# Patient Record
Sex: FEMALE | Race: WHITE | Employment: UNEMPLOYED | URBAN - METROPOLITAN AREA
[De-identification: names, ages, dates, MRNs, and addresses within clinical notes are randomized per-mention and may not be internally consistent; named-entity substitution may affect disease eponyms.]

---

## 2017-07-09 ENCOUNTER — TELEPHONE (OUTPATIENT)
Dept: BEHAVIORAL HEALTH | Facility: CLINIC | Age: 27
End: 2017-07-09

## 2017-07-09 ENCOUNTER — HOSPITAL ENCOUNTER (EMERGENCY)
Facility: CLINIC | Age: 27
Discharge: ANOTHER HOSPITAL | End: 2017-07-10
Attending: PHYSICIAN ASSISTANT | Admitting: PHYSICIAN ASSISTANT
Payer: COMMERCIAL

## 2017-07-09 DIAGNOSIS — F23 ACUTE PSYCHOSIS (H): ICD-10-CM

## 2017-07-09 DIAGNOSIS — F41.9 ANXIETY: ICD-10-CM

## 2017-07-09 DIAGNOSIS — R45.851 SUICIDAL THOUGHTS: ICD-10-CM

## 2017-07-09 DIAGNOSIS — F23 ACUTE PARANOID REACTION (H): ICD-10-CM

## 2017-07-09 DIAGNOSIS — F90.9 ATTENTION DEFICIT HYPERACTIVITY DISORDER (ADHD), UNSPECIFIED ADHD TYPE: ICD-10-CM

## 2017-07-09 DIAGNOSIS — F30.9 MANIA (H): ICD-10-CM

## 2017-07-09 LAB
ALBUMIN SERPL-MCNC: 3.9 G/DL (ref 3.4–5)
ALBUMIN UR-MCNC: NEGATIVE MG/DL
ALP SERPL-CCNC: 110 U/L (ref 40–150)
ALT SERPL W P-5'-P-CCNC: 24 U/L (ref 0–50)
AMPHETAMINES UR QL: ABNORMAL NG/ML
ANION GAP SERPL CALCULATED.3IONS-SCNC: 7 MMOL/L (ref 3–14)
APAP SERPL-MCNC: NORMAL MG/L (ref 10–20)
APPEARANCE UR: CLEAR
AST SERPL W P-5'-P-CCNC: 25 U/L (ref 0–45)
BACTERIA #/AREA URNS HPF: ABNORMAL /HPF
BARBITURATES UR QL SCN: ABNORMAL NG/ML
BASOPHILS # BLD AUTO: 0 10E9/L (ref 0–0.2)
BASOPHILS NFR BLD AUTO: 0.4 %
BENZODIAZ UR QL SCN: ABNORMAL NG/ML
BILIRUB SERPL-MCNC: 1 MG/DL (ref 0.2–1.3)
BILIRUB UR QL STRIP: NEGATIVE
BUN SERPL-MCNC: 9 MG/DL (ref 7–30)
BUPRENORPHINE UR QL: ABNORMAL NG/ML
CALCIUM SERPL-MCNC: 8.4 MG/DL (ref 8.5–10.1)
CANNABINOIDS UR QL: ABNORMAL NG/ML
CHLORIDE SERPL-SCNC: 99 MMOL/L (ref 94–109)
CO2 SERPL-SCNC: 29 MMOL/L (ref 20–32)
COCAINE UR QL SCN: ABNORMAL NG/ML
COLOR UR AUTO: YELLOW
CREAT SERPL-MCNC: 0.55 MG/DL (ref 0.52–1.04)
CRP SERPL-MCNC: <2.9 MG/L (ref 0–8)
D-METHAMPHET UR QL: ABNORMAL NG/ML
DIFFERENTIAL METHOD BLD: NORMAL
EOSINOPHIL # BLD AUTO: 0.1 10E9/L (ref 0–0.7)
EOSINOPHIL NFR BLD AUTO: 1.1 %
ERYTHROCYTE [DISTWIDTH] IN BLOOD BY AUTOMATED COUNT: 13.5 % (ref 10–15)
ETHANOL SERPL-MCNC: <0.01 G/DL
GFR SERPL CREATININE-BSD FRML MDRD: ABNORMAL ML/MIN/1.7M2
GLUCOSE SERPL-MCNC: 86 MG/DL (ref 70–99)
GLUCOSE UR STRIP-MCNC: NEGATIVE MG/DL
HCG UR QL: NEGATIVE
HCT VFR BLD AUTO: 39 % (ref 35–47)
HGB BLD-MCNC: 13 G/DL (ref 11.7–15.7)
HGB UR QL STRIP: NEGATIVE
IMM GRANULOCYTES # BLD: 0 10E9/L (ref 0–0.4)
IMM GRANULOCYTES NFR BLD: 0.1 %
KETONES UR STRIP-MCNC: NEGATIVE MG/DL
LEUKOCYTE ESTERASE UR QL STRIP: ABNORMAL
LYMPHOCYTES # BLD AUTO: 2.4 10E9/L (ref 0.8–5.3)
LYMPHOCYTES NFR BLD AUTO: 31.6 %
MCH RBC QN AUTO: 29.1 PG (ref 26.5–33)
MCHC RBC AUTO-ENTMCNC: 33.3 G/DL (ref 31.5–36.5)
MCV RBC AUTO: 87 FL (ref 78–100)
METHADONE UR QL SCN: ABNORMAL NG/ML
MONOCYTES # BLD AUTO: 0.8 10E9/L (ref 0–1.3)
MONOCYTES NFR BLD AUTO: 10.6 %
NEUTROPHILS # BLD AUTO: 4.2 10E9/L (ref 1.6–8.3)
NEUTROPHILS NFR BLD AUTO: 56.2 %
NITRATE UR QL: NEGATIVE
OPIATES UR QL SCN: ABNORMAL NG/ML
OXYCODONE UR QL SCN: ABNORMAL NG/ML
PCP UR QL SCN: ABNORMAL NG/ML
PH UR STRIP: 7 PH (ref 5–7)
PLATELET # BLD AUTO: 232 10E9/L (ref 150–450)
POTASSIUM SERPL-SCNC: 3 MMOL/L (ref 3.4–5.3)
PROPOXYPH UR QL: ABNORMAL NG/ML
PROT SERPL-MCNC: 7.5 G/DL (ref 6.8–8.8)
RBC # BLD AUTO: 4.46 10E12/L (ref 3.8–5.2)
RBC #/AREA URNS AUTO: 1 /HPF (ref 0–2)
SALICYLATES SERPL-MCNC: NORMAL MG/DL
SODIUM SERPL-SCNC: 135 MMOL/L (ref 133–144)
SP GR UR STRIP: 1.01 (ref 1–1.03)
SQUAMOUS #/AREA URNS AUTO: 3 /HPF (ref 0–1)
TRICYCLICS UR QL SCN: ABNORMAL NG/ML
TSH SERPL DL<=0.005 MIU/L-ACNC: 1.07 MU/L (ref 0.4–4)
URN SPEC COLLECT METH UR: ABNORMAL
UROBILINOGEN UR STRIP-MCNC: 0 MG/DL (ref 0–2)
WBC # BLD AUTO: 7.5 10E9/L (ref 4–11)
WBC #/AREA URNS AUTO: 6 /HPF (ref 0–2)

## 2017-07-09 PROCEDURE — 87086 URINE CULTURE/COLONY COUNT: CPT | Performed by: PHYSICIAN ASSISTANT

## 2017-07-09 PROCEDURE — 86140 C-REACTIVE PROTEIN: CPT | Performed by: PHYSICIAN ASSISTANT

## 2017-07-09 PROCEDURE — 80329 ANALGESICS NON-OPIOID 1 OR 2: CPT | Performed by: PHYSICIAN ASSISTANT

## 2017-07-09 PROCEDURE — 25000128 H RX IP 250 OP 636: Performed by: PHYSICIAN ASSISTANT

## 2017-07-09 PROCEDURE — 80306 DRUG TEST PRSMV INSTRMNT: CPT | Performed by: PHYSICIAN ASSISTANT

## 2017-07-09 PROCEDURE — 99285 EMERGENCY DEPT VISIT HI MDM: CPT | Mod: 25 | Performed by: PHYSICIAN ASSISTANT

## 2017-07-09 PROCEDURE — 25000125 ZZHC RX 250: Performed by: PHYSICIAN ASSISTANT

## 2017-07-09 PROCEDURE — 90791 PSYCH DIAGNOSTIC EVALUATION: CPT

## 2017-07-09 PROCEDURE — 81001 URINALYSIS AUTO W/SCOPE: CPT | Performed by: PHYSICIAN ASSISTANT

## 2017-07-09 PROCEDURE — 96372 THER/PROPH/DIAG INJ SC/IM: CPT | Performed by: PHYSICIAN ASSISTANT

## 2017-07-09 PROCEDURE — 80320 DRUG SCREEN QUANTALCOHOLS: CPT | Performed by: PHYSICIAN ASSISTANT

## 2017-07-09 PROCEDURE — 99285 EMERGENCY DEPT VISIT HI MDM: CPT | Mod: Z6 | Performed by: PHYSICIAN ASSISTANT

## 2017-07-09 PROCEDURE — 96374 THER/PROPH/DIAG INJ IV PUSH: CPT | Performed by: PHYSICIAN ASSISTANT

## 2017-07-09 PROCEDURE — 85025 COMPLETE CBC W/AUTO DIFF WBC: CPT | Performed by: PHYSICIAN ASSISTANT

## 2017-07-09 PROCEDURE — 80053 COMPREHEN METABOLIC PANEL: CPT | Performed by: PHYSICIAN ASSISTANT

## 2017-07-09 PROCEDURE — 84443 ASSAY THYROID STIM HORMONE: CPT | Performed by: PHYSICIAN ASSISTANT

## 2017-07-09 PROCEDURE — 81025 URINE PREGNANCY TEST: CPT | Performed by: PHYSICIAN ASSISTANT

## 2017-07-09 PROCEDURE — S0166 INJ OLANZAPINE 2.5MG: HCPCS | Performed by: PHYSICIAN ASSISTANT

## 2017-07-09 RX ORDER — LORAZEPAM 2 MG/ML
1 INJECTION INTRAMUSCULAR ONCE
Status: COMPLETED | OUTPATIENT
Start: 2017-07-09 | End: 2017-07-10

## 2017-07-09 RX ORDER — LIDOCAINE 40 MG/G
CREAM TOPICAL
Status: DISCONTINUED | OUTPATIENT
Start: 2017-07-09 | End: 2017-07-10 | Stop reason: HOSPADM

## 2017-07-09 RX ORDER — OLANZAPINE 10 MG/2ML
10 INJECTION, POWDER, FOR SOLUTION INTRAMUSCULAR
Status: COMPLETED | OUTPATIENT
Start: 2017-07-09 | End: 2017-07-09

## 2017-07-09 RX ORDER — ACAMPROSATE CALCIUM 333 MG/1
666 TABLET, DELAYED RELEASE ORAL 3 TIMES DAILY
Status: ON HOLD | COMMUNITY
End: 2017-07-25

## 2017-07-09 RX ORDER — LORAZEPAM 2 MG/ML
1 INJECTION INTRAMUSCULAR ONCE
Status: COMPLETED | OUTPATIENT
Start: 2017-07-09 | End: 2017-07-09

## 2017-07-09 RX ADMIN — LORAZEPAM 1 MG: 2 INJECTION INTRAMUSCULAR; INTRAVENOUS at 18:02

## 2017-07-09 RX ADMIN — OLANZAPINE 10 MG: 10 INJECTION, POWDER, LYOPHILIZED, FOR SOLUTION INTRAMUSCULAR at 18:35

## 2017-07-09 ASSESSMENT — ENCOUNTER SYMPTOMS
FEVER: 0
ABDOMINAL PAIN: 0
HALLUCINATIONS: 0
SHORTNESS OF BREATH: 0
HYPERACTIVE: 1
FATIGUE: 1
SLEEP DISTURBANCE: 1
NERVOUS/ANXIOUS: 1
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
## 2017-07-09 NOTE — ED NOTES
Pt sitting on the floor reading magazines at this time.  Is waiting on her dinner tray and urine results.

## 2017-07-09 NOTE — ED NOTES
Patient agreeable and cooperative with Zyprexa injection. She does verbalize being agitated about being on a hold.
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
## 2017-07-09 NOTE — ED NOTES
Patient is sitting up in chair and eating dinner. She is c/o feeling restless and anxious. She is aware of Ativan given and states she is hopeful it will help her relax. She is requesting her cell phone back. Mariya ROSADO is aware.

## 2017-07-09 NOTE — ED NOTES
Pt in room, sleeping on cart at this time.  Waiting on lab results and for SANE nurse to arrive at facility.
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
## 2017-07-09 NOTE — ED NOTES
[facility] nurse paged at 1862, waiting on Alexandrea to call back.  Pt provided urine sample.  Will save for [facility] nurse and collect next sample for testing.
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
## 2017-07-10 ENCOUNTER — HOSPITAL ENCOUNTER (INPATIENT)
Facility: CLINIC | Age: 27
LOS: 16 days | Discharge: HOME | DRG: 897 | End: 2017-07-26
Attending: PSYCHIATRY & NEUROLOGY | Admitting: PSYCHIATRY & NEUROLOGY
Payer: COMMERCIAL

## 2017-07-10 VITALS
RESPIRATION RATE: 12 BRPM | OXYGEN SATURATION: 99 % | TEMPERATURE: 97.6 F | WEIGHT: 145 LBS | DIASTOLIC BLOOD PRESSURE: 92 MMHG | HEART RATE: 77 BPM | SYSTOLIC BLOOD PRESSURE: 147 MMHG | BODY MASS INDEX: 23.3 KG/M2 | HEIGHT: 66 IN

## 2017-07-10 DIAGNOSIS — F51.01 PRIMARY INSOMNIA: Primary | ICD-10-CM

## 2017-07-10 DIAGNOSIS — F10.20 UNCOMPLICATED ALCOHOL DEPENDENCE (H): ICD-10-CM

## 2017-07-10 DIAGNOSIS — F41.9 ANXIETY: ICD-10-CM

## 2017-07-10 DIAGNOSIS — F33.1 MODERATE EPISODE OF RECURRENT MAJOR DEPRESSIVE DISORDER (H): ICD-10-CM

## 2017-07-10 PROBLEM — F32.A DEPRESSION: Status: ACTIVE | Noted: 2017-07-10

## 2017-07-10 PROCEDURE — 96376 TX/PRO/DX INJ SAME DRUG ADON: CPT | Performed by: PHYSICIAN ASSISTANT

## 2017-07-10 PROCEDURE — 25000128 H RX IP 250 OP 636: Performed by: PHYSICIAN ASSISTANT

## 2017-07-10 PROCEDURE — 25000132 ZZH RX MED GY IP 250 OP 250 PS 637: Performed by: NURSE PRACTITIONER

## 2017-07-10 PROCEDURE — 25000128 H RX IP 250 OP 636: Performed by: FAMILY MEDICINE

## 2017-07-10 PROCEDURE — 12400007 ZZH R&B MH INTERMEDIATE UMMC

## 2017-07-10 PROCEDURE — 25000132 ZZH RX MED GY IP 250 OP 250 PS 637: Performed by: FAMILY MEDICINE

## 2017-07-10 RX ORDER — VENLAFAXINE HYDROCHLORIDE 75 MG/1
75 CAPSULE, EXTENDED RELEASE ORAL ONCE
Status: COMPLETED | OUTPATIENT
Start: 2017-07-10 | End: 2017-07-10

## 2017-07-10 RX ORDER — BISACODYL 10 MG
10 SUPPOSITORY, RECTAL RECTAL DAILY PRN
Status: DISCONTINUED | OUTPATIENT
Start: 2017-07-10 | End: 2017-07-26 | Stop reason: HOSPADM

## 2017-07-10 RX ORDER — ACETAMINOPHEN 325 MG/1
650 TABLET ORAL EVERY 4 HOURS PRN
Status: DISCONTINUED | OUTPATIENT
Start: 2017-07-10 | End: 2017-07-26 | Stop reason: HOSPADM

## 2017-07-10 RX ORDER — HYDROXYZINE HYDROCHLORIDE 50 MG/1
50 TABLET, FILM COATED ORAL 3 TIMES DAILY PRN
Status: DISCONTINUED | OUTPATIENT
Start: 2017-07-10 | End: 2017-07-26 | Stop reason: HOSPADM

## 2017-07-10 RX ORDER — OLANZAPINE 10 MG/2ML
10 INJECTION, POWDER, FOR SOLUTION INTRAMUSCULAR
Status: DISCONTINUED | OUTPATIENT
Start: 2017-07-10 | End: 2017-07-10 | Stop reason: HOSPADM

## 2017-07-10 RX ORDER — HYDROXYZINE HYDROCHLORIDE 25 MG/1
25-50 TABLET, FILM COATED ORAL EVERY 4 HOURS PRN
Status: DISCONTINUED | OUTPATIENT
Start: 2017-07-10 | End: 2017-07-11

## 2017-07-10 RX ORDER — ACAMPROSATE CALCIUM 333 MG/1
666 TABLET, DELAYED RELEASE ORAL 3 TIMES DAILY
Status: DISCONTINUED | OUTPATIENT
Start: 2017-07-10 | End: 2017-07-26 | Stop reason: HOSPADM

## 2017-07-10 RX ORDER — TRAZODONE HYDROCHLORIDE 50 MG/1
50 TABLET, FILM COATED ORAL
Status: DISCONTINUED | OUTPATIENT
Start: 2017-07-10 | End: 2017-07-13

## 2017-07-10 RX ORDER — OLANZAPINE 10 MG/2ML
10 INJECTION, POWDER, FOR SOLUTION INTRAMUSCULAR
Status: DISCONTINUED | OUTPATIENT
Start: 2017-07-10 | End: 2017-07-26 | Stop reason: HOSPADM

## 2017-07-10 RX ORDER — ALUMINA, MAGNESIA, AND SIMETHICONE 2400; 2400; 240 MG/30ML; MG/30ML; MG/30ML
30 SUSPENSION ORAL EVERY 4 HOURS PRN
Status: DISCONTINUED | OUTPATIENT
Start: 2017-07-10 | End: 2017-07-26 | Stop reason: HOSPADM

## 2017-07-10 RX ORDER — OLANZAPINE 10 MG/1
10 TABLET ORAL
Status: DISCONTINUED | OUTPATIENT
Start: 2017-07-10 | End: 2017-07-26 | Stop reason: HOSPADM

## 2017-07-10 RX ORDER — LORAZEPAM 2 MG/ML
1 INJECTION INTRAMUSCULAR
Status: COMPLETED | OUTPATIENT
Start: 2017-07-10 | End: 2017-07-10

## 2017-07-10 RX ADMIN — LORAZEPAM 1 MG: 2 INJECTION INTRAMUSCULAR; INTRAVENOUS at 13:39

## 2017-07-10 RX ADMIN — HYDROXYZINE HYDROCHLORIDE 50 MG: 50 TABLET, FILM COATED ORAL at 19:14

## 2017-07-10 RX ADMIN — VENLAFAXINE HYDROCHLORIDE 75 MG: 75 CAPSULE, EXTENDED RELEASE ORAL at 11:08

## 2017-07-10 RX ADMIN — ACAMPROSATE CALCIUM 666 MG: 333 TABLET, DELAYED RELEASE ORAL at 19:14

## 2017-07-10 RX ADMIN — LORAZEPAM 1 MG: 2 INJECTION INTRAMUSCULAR; INTRAVENOUS at 05:03

## 2017-07-10 RX ADMIN — OLANZAPINE 10 MG: 10 TABLET, FILM COATED ORAL at 22:12

## 2017-07-10 ASSESSMENT — ACTIVITIES OF DAILY LIVING (ADL)
TRANSFERRING: 0-->INDEPENDENT
CHANGE_IN_FUNCTIONAL_STATUS_SINCE_ONSET_OF_CURRENT_ILLNESS/INJURY: NO
GROOMING: INDEPENDENT
RETIRED_COMMUNICATION: 0-->UNDERSTANDS/COMMUNICATES WITHOUT DIFFICULTY
DRESS: INDEPENDENT
SWALLOWING: 0-->SWALLOWS FOODS/LIQUIDS WITHOUT DIFFICULTY
BATHING: 0-->INDEPENDENT
RETIRED_EATING: 0-->INDEPENDENT
CURRENT_FUNCTIONAL_LEVEL_COMMENT: GOOD
PRIOR_FUNCTIONAL_LEVEL_COMMENT: GOOD
COGNITION: 0 - NO COGNITION ISSUES REPORTED
DRESS: 0-->INDEPENDENT
TOILETING: 0-->INDEPENDENT
SWALLOWING: 0-->SWALLOWS FOODS/LIQUIDS WITHOUT DIFFICULTY
COMMUNICATION: 0-->UNDERSTANDS/COMMUNICATES WITHOUT DIFFICULTY
AMBULATION: 0-->INDEPENDENT
AMBULATION: 0-->INDEPENDENT
EATING: 0-->INDEPENDENT
DRESS: 0-->INDEPENDENT
BATHING: 0-->INDEPENDENT
FALL_HISTORY_WITHIN_LAST_SIX_MONTHS: NO
TRANSFERRING: 0-->INDEPENDENT
ORAL_HYGIENE: INDEPENDENT
TOILETING: 0-->INDEPENDENT

## 2017-07-10 NOTE — PROGRESS NOTES
07/10/17 3285   Patient Belongings   Did you bring any home meds/supplements to the hospital?  No   Patient Belongings cell phone/electronics;earings;money (see comment);purse;other (see comments)   Disposition of Belongings Locker #15   Locker #15: cell phone (cracked screen), purse, sunglasses, ,  cable, earbud headphones, cigarettes, assorted loose change, assorted makeup, social security card, shaving razor, earring, large hair binder.  Nothing to Security.  ADMISSION:  I am responsible for any personal items that are not sent to the safe or pharmacy. [facility] is not responsible for loss, theft or damage of any property in my possession.    Patient Signature _____________________ Date/Time _____________________    Staff Signature _______________________ Date/Time _____________________    2nd Staff person, if patient is unable/unwilling to sign  ___________________________________ Date/Time _____________________  DISCHARGE:  All personal items have been returned to me.    Patient Signature _____________________ Date/Time _____________________    Staff Signature _______________________ Date/Time _____________________

## 2017-07-10 NOTE — ED NOTES
Patient has been accepted to [facility], [facility] under the care of Dr. Pablo Raymond. There are currently no beds available until they have discharges. Writer instructed to call unit in an hour to check on availability. Per Dr. Raymond, patient must be placed on 72 hour HOLD prior to patient being transferred. Kelly Pérez RN

## 2017-07-10 NOTE — TELEPHONE ENCOUNTER
R:   Admit to 32     / Kathy   Accepted by Kathy           Pt to be placed on a 72 Hr Hold once  Timing for admit is determined, per nga Mendoza RN in [facility] ED       Hui informed about bed @ 11:36   Celina charge on 32 informed  11:40    Awaiting DC's
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
## 2017-07-10 NOTE — ED NOTES
Pt on the list for [facility].  Intake states that she will need to wait for discharges in the morning.
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
## 2017-07-10 NOTE — ED NOTES
"Pt. Has flight of ideas.  Pt stated she has court today and needs to call her CPS worker.  She then stated she needs to call her realtor.  Stated she needs to also call Pedro who is her significant other and later stated she needs to call Ricky who is her \"boyfriend.\"  Pt was up to use the bathroom and her phone is charging at the desk so she can call her CPS worker.  Pt. Stated she has been off her effexor since Friday because she was \"afraid she would run out\" and that \"her friend in [place] has her extras.\"  She agreed to be reassessed by BALDOMERO Givens student nurse    "

## 2017-07-10 NOTE — ED NOTES
"Patient is agitated standing at the doorway; states she is leaving against medical advice. Informed patient she is on a Health Officer Hold due to her mental illness and is not permitted to leave. Patient is demanding to speak to \"the head of the hospital.\" Kelly Pérez RN   "

## 2017-07-10 NOTE — IP AVS SNAPSHOT
MRN:[id number]                      After Visit Summary   7/10/2017    Mallory Jaramillo    MRN: [id number]           Thank you!     Thank you for choosing [facility] for your care. Our goal is always to provide you with excellent care.        Patient Information     Date Of Birth          1990        Designated Caregiver       Most Recent Value    Caregiver    Will someone help with your care after discharge? no      About your hospital stay     You were admitted on:  July 10, 2017 You last received care in the:  [facility] 32NR    You were discharged on:  July 26, 2017       Who to Call     For medical emergencies, please call 911.  For non-urgent questions about your medical care, please call your primary care provider or clinic, [phone]          Attending Provider     Provider Specialty    Pablo Raymond MD Psychiatry    AndJohnny gentile MD Psychiatry       Primary Care Provider Office Phone # Fax #    [facility] [phone]      Further instructions from your care team       Behavioral Discharge Planning and Instructions    Summary: You were admitted to station 32 from [facility] on a 72 hour hold. You were agitated and psychotic in context of using methamphetamine. A petition for commitment was made and supported.   You were dually committed to the [facility] on 7/21/2017. You have agreed to a Stay of Commitment and are being discharged to treatment. The Stay of Commitment will remain in effect for the duration of the Commitment which expires on 1/19/2018.   You are being discharged to chemical dependency treatment and sober housing.         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 Direct care was provided by unit nurses and staff. You met with case management. You had opportunities to participate in therapeutic groups on the unit. At this time you report your mood has stabilized and you report you are not having thoughts or intent to harm yourself or others. You will be discharged to chemical dependency treatment and sober housing.      Symptoms to Report: feeling more aggressive, increased confusion, losing more sleep, mood getting worse, thoughts of suicide or use of drugs or alcohol    Lifestyle Adjustment: Take medications only as prescribed. Do not use alcohol or illicit drugs. Attend all scheduled appointments with your outpatient providers. Call at least 24 hours in advance if you need to reschedule an appointment to ensure continued access to your outpatient providers. Contact the appropriate professional or hotline listed below as needed for support if your symptoms continue, or call 9-1-1 in an emergency.       Psychiatry Follow-up:   You are being discharged to chemical dependency treatment at [facility]. Your intake is at 10 am on Thursday 7/26.    You will be residing at [facility] at [address] [place] [address]    Please follow up with your primary care provider at [facility] in [place]  [address] [place] [address]  Phone: [phone]  Fax: [phone]    Your child protection worker is Mandy Cassidy. Office [phone]; [phone] (cell)  Fax: [phone]  A referral for [facility] case management services was made to [facility]. [address] [place] [address]  WOODY Mercedes, [facility], Director  (direct) [phone] (fax) [phone]        Resources:   Crisis Intervention: 974.513.7534 or 400-449-8688 (TTY: 854.331.2771).  Call anytime for help.  National [place] on Mental Illness (www.mn.winifred.org):  "731.672.5048 or 277-567-0904.  Alcoholics Anonymous (www.alcoholics-anonymous.org): Check your phone book for your local chapter.  Suicide Awareness Voices of Education (SAVE) (www.save.org): 291-478-MHZS (1989)  National Suicide Prevention Line (www.mentalhealthmn.org): 328-733-HWKY (9081)  Mental Health Consumer/Survivor Network of [place] (www.mhcsn.net): 463.359.1831 or 174-452-0440  Mental Health Association of [place] (www.mentalhealth.org): 379.834.7184 or 015-915-9436    General Medication Instructions:   See your medication sheet(s) for instructions.   Take all medicines as directed.  Make no changes unless your doctor suggests them.   Go to all your doctor visits.  Be sure to have all your required lab tests. This way, your medicines can be refilled on time.  Do not use any drugs not prescribed by your doctor.  Avoid alcohol.    The treatment team has appreciated the opportunity to work with you.  We wish you the best in the future.  If you have any questions or concerns our unit number is [phone].         Pending Results     No orders found from 7/8/2017 to 7/11/2017.            Admission Information     Date & Time Provider Department Dept. Phone    7/10/2017 Johnny Salcedo MD  32NR [phone]      Your Vitals Were     Blood Pressure Pulse Temperature Respirations Height Weight    129/76 (BP Location: Left arm) 88 97.4  F (36.3  C) (Oral) 17 1.676 m (5' 6\") 77.7 kg (171 lb 3.2 oz)    Pulse Oximetry BMI (Body Mass Index)                97% 27.63 kg/m2          [employer] Information     [employer] lets you send messages to your doctor, view your test results, renew your prescriptions, schedule appointments and more. To sign up, go to [email or web address] . Click on \"Log in\" on the left side of the screen, which will take you to the Welcome page. Then click on \"Sign up Now\" on the right side of the page.     You will be asked to enter the access code listed below, as well as some personal information. " Please follow the directions to create your username and password.     Your access code is: [email or web address]  Expires: 10/24/2017 11:55 AM     Your access code will  in 90 days. If you need help or a new code, please call your [facility] clinic or [phone].        Care EveryWhere ID     This is your Care EveryWhere ID. This could be used by other organizations to access your [facility] medical records  [id number]        Equal Access to Services     INES MAURO : Hadii fifi ku hadasho Soomaali, waaxda luqadaha, qaybta kaalmada adeegyada, waxay bharathin haypetern malena conde. So [facility] [phone].    ATENCIÓN: Si habla español, tiene a cortez disposición servicios gratuitos de asistencia lingüística. [facility] [phone].    We comply with applicable federal civil rights laws and [place] laws. We do not discriminate on the basis of race, color, national origin, age, disability sex, sexual orientation or gender identity.               Review of your medicines      START taking        Dose / Directions    melatonin 3 MG tablet   Used for:  Primary insomnia        Dose:  6 mg   Take 2 tablets (6 mg) by mouth nightly as needed for sleep   Quantity:  60 tablet   Refills:  0       venlafaxine 150 MG Tb24 24 hr tablet   Commonly known as:  EFFEXOR-ER   Replaces:  EFFEXOR PO        Dose:  150 mg   Take 1 tablet (150 mg) by mouth daily (with breakfast)   Quantity:  30 each   Refills:  0         CONTINUE these medicines which may have CHANGED, or have new prescriptions. If we are uncertain of the size of tablets/capsules you have at home, strength may be listed as something that might have changed.        Dose / Directions    hydrOXYzine 50 MG tablet   Commonly known as:  ATARAX   This may have changed:    - medication strength  - reasons to take this   Used for:  Anxiety        Dose:  50 mg   Take 1 tablet (50 mg) by mouth 3 times daily as needed for anxiety   Quantity:  90 tablet   Refills:  0         CONTINUE these  medicines which have NOT CHANGED        Dose / Directions    acamprosate 333 MG EC tablet   Commonly known as:  CAMPRAL   Used for:  Uncomplicated alcohol dependence (H)        Dose:  666 mg   Take 2 tablets (666 mg) by mouth 3 times daily   Quantity:  180 tablet   Refills:  0         STOP taking     EFFEXOR PO   Replaced by:  venlafaxine 150 MG Tb24 24 hr tablet           VYVANSE PO                Where to get your medicines      These medications were sent to [facility] [place], [place] - [address]  [address] [facility], [facility] [address]     Phone:  [phone]     acamprosate 333 MG EC tablet    hydrOXYzine 50 MG tablet    melatonin 3 MG tablet    venlafaxine 150 MG Tb24 24 hr tablet                Protect others around you: Learn how to safely use, store and throw away your medicines at www.disposemymeds.org.             Medication List: This is a list of all your medications and when to take them. Check marks below indicate your daily home schedule. Keep this list as a reference.      Medications           Morning Afternoon Evening Bedtime As Needed    acamprosate 333 MG EC tablet   Commonly known as:  CAMPRAL   Take 2 tablets (666 mg) by mouth 3 times daily   Last time this was given:  666 mg on 7/26/2017  8:23 AM                                hydrOXYzine 50 MG tablet   Commonly known as:  ATARAX   Take 1 tablet (50 mg) by mouth 3 times daily as needed for anxiety   Last time this was given:  50 mg on 7/26/2017  9:57 AM                                melatonin 3 MG tablet   Take 2 tablets (6 mg) by mouth nightly as needed for sleep   Last time this was given:  6 mg on 7/25/2017  8:32 PM                                venlafaxine 150 MG Tb24 24 hr tablet   Commonly known as:  EFFEXOR-ER   Take 1 tablet (150 mg) by mouth daily (with breakfast)   Last time this was given:  150 mg on 7/26/2017  8:23 AM

## 2017-07-10 NOTE — ED NOTES
Report given to Kelly Pérez and Yen Givens by Bruna BELL and care handed off. Yen Givens student nurse

## 2017-07-10 NOTE — ED NOTES
Patient accessing Facebook while speaking with DEC . RN into room and informed patient she is not allowed to do so. Patient is irritated and agitated with staff and . Calmed down and assessment completed. Kelly Pérez RN

## 2017-07-10 NOTE — ED NOTES
Went in to try to wake up patient to eat lunch.  Pt. Sleeping soundly. Lunch tray left on chair. Yen Givens student nurse

## 2017-07-10 NOTE — IP AVS SNAPSHOT
[address]    [address] [facility] [address] [facility]    [facility] [address]    Phone:  [phone]                                       After Visit Summary   7/10/2017    Mallory Jaramillo    MRN: [id number]           After Visit Summary Signature Page     I have received my discharge instructions, and my questions have been answered. I have discussed any challenges I see with this plan with the nurse or doctor.    ..........................................................................................................................................  Patient/Patient Representative Signature      ..........................................................................................................................................  Patient Representative Print Name and Relationship to Patient    ..................................................               ................................................  Date                                            Time    ..........................................................................................................................................  Reviewed by Signature/Title    ...................................................              ..............................................  Date                                                            Time

## 2017-07-10 NOTE — ED NOTES
Pt stirring at this time and requested to leave, she wanted to know why she was still here and wanted her phone and then reported that she wanted to have her  call us and tell us she could leave, she was told she was not medically able to leave at this time and that she would not have her stuff at this time for safety, she then reported that she was tired and was told she could go back to sleep, she layed back down and went back to sleep

## 2017-07-10 NOTE — ED NOTES
PATIENT'S KEYS LEFT AT SECURITY DESK. PATIENT'S DAD OR FRIEND WILL COME AND  HER CAR. Per patient request. Kelly Pérez RN

## 2017-07-11 LAB
ALBUMIN SERPL-MCNC: 3.5 G/DL (ref 3.4–5)
ALP SERPL-CCNC: 96 U/L (ref 40–150)
ALT SERPL W P-5'-P-CCNC: 21 U/L (ref 0–50)
ANION GAP SERPL CALCULATED.3IONS-SCNC: 10 MMOL/L (ref 3–14)
AST SERPL W P-5'-P-CCNC: 17 U/L (ref 0–45)
BACTERIA SPEC CULT: NORMAL
BASOPHILS # BLD AUTO: 0 10E9/L (ref 0–0.2)
BASOPHILS NFR BLD AUTO: 0.3 %
BILIRUB SERPL-MCNC: 0.6 MG/DL (ref 0.2–1.3)
BUN SERPL-MCNC: 7 MG/DL (ref 7–30)
CALCIUM SERPL-MCNC: 8.7 MG/DL (ref 8.5–10.1)
CHLORIDE SERPL-SCNC: 105 MMOL/L (ref 94–109)
CHOLEST SERPL-MCNC: 181 MG/DL
CO2 SERPL-SCNC: 25 MMOL/L (ref 20–32)
CREAT SERPL-MCNC: 0.57 MG/DL (ref 0.52–1.04)
DIFFERENTIAL METHOD BLD: NORMAL
EOSINOPHIL # BLD AUTO: 0.1 10E9/L (ref 0–0.7)
EOSINOPHIL NFR BLD AUTO: 2 %
ERYTHROCYTE [DISTWIDTH] IN BLOOD BY AUTOMATED COUNT: 13.6 % (ref 10–15)
GFR SERPL CREATININE-BSD FRML MDRD: ABNORMAL ML/MIN/1.7M2
GLUCOSE SERPL-MCNC: 102 MG/DL (ref 70–99)
HCG SERPL QL: NEGATIVE
HCT VFR BLD AUTO: 42.7 % (ref 35–47)
HDLC SERPL-MCNC: 112 MG/DL
HGB BLD-MCNC: 14 G/DL (ref 11.7–15.7)
IMM GRANULOCYTES # BLD: 0 10E9/L (ref 0–0.4)
IMM GRANULOCYTES NFR BLD: 0.3 %
LDLC SERPL CALC-MCNC: 58 MG/DL
LYMPHOCYTES # BLD AUTO: 2.4 10E9/L (ref 0.8–5.3)
LYMPHOCYTES NFR BLD AUTO: 33.1 %
MCH RBC QN AUTO: 29.2 PG (ref 26.5–33)
MCHC RBC AUTO-ENTMCNC: 32.8 G/DL (ref 31.5–36.5)
MCV RBC AUTO: 89 FL (ref 78–100)
MICRO REPORT STATUS: NORMAL
MONOCYTES # BLD AUTO: 0.4 10E9/L (ref 0–1.3)
MONOCYTES NFR BLD AUTO: 5.7 %
NEUTROPHILS # BLD AUTO: 4.2 10E9/L (ref 1.6–8.3)
NEUTROPHILS NFR BLD AUTO: 58.6 %
NONHDLC SERPL-MCNC: 69 MG/DL
NRBC # BLD AUTO: 0 10*3/UL
NRBC BLD AUTO-RTO: 0 /100
PLATELET # BLD AUTO: 261 10E9/L (ref 150–450)
POTASSIUM SERPL-SCNC: 4 MMOL/L (ref 3.4–5.3)
PROT SERPL-MCNC: 7 G/DL (ref 6.8–8.8)
RBC # BLD AUTO: 4.79 10E12/L (ref 3.8–5.2)
SODIUM SERPL-SCNC: 140 MMOL/L (ref 133–144)
SPECIMEN SOURCE: NORMAL
TRIGL SERPL-MCNC: 56 MG/DL
TSH SERPL DL<=0.005 MIU/L-ACNC: 1.32 MU/L (ref 0.4–4)
WBC # BLD AUTO: 7.1 10E9/L (ref 4–11)

## 2017-07-11 PROCEDURE — 25000132 ZZH RX MED GY IP 250 OP 250 PS 637: Performed by: NURSE PRACTITIONER

## 2017-07-11 PROCEDURE — 85025 COMPLETE CBC W/AUTO DIFF WBC: CPT | Performed by: NURSE PRACTITIONER

## 2017-07-11 PROCEDURE — 99222 1ST HOSP IP/OBS MODERATE 55: CPT | Mod: AI | Performed by: NURSE PRACTITIONER

## 2017-07-11 PROCEDURE — 36415 COLL VENOUS BLD VENIPUNCTURE: CPT | Performed by: NURSE PRACTITIONER

## 2017-07-11 PROCEDURE — 99207 ZZC CDG-CODE INCORRECT PER BILLING BASED ON TIME: CPT | Performed by: NURSE PRACTITIONER

## 2017-07-11 PROCEDURE — 84443 ASSAY THYROID STIM HORMONE: CPT | Performed by: NURSE PRACTITIONER

## 2017-07-11 PROCEDURE — 80053 COMPREHEN METABOLIC PANEL: CPT | Performed by: NURSE PRACTITIONER

## 2017-07-11 PROCEDURE — 80061 LIPID PANEL: CPT | Performed by: NURSE PRACTITIONER

## 2017-07-11 PROCEDURE — 84703 CHORIONIC GONADOTROPIN ASSAY: CPT | Performed by: NURSE PRACTITIONER

## 2017-07-11 PROCEDURE — 12400007 ZZH R&B MH INTERMEDIATE UMMC

## 2017-07-11 RX ORDER — GINSENG 100 MG
CAPSULE ORAL 2 TIMES DAILY PRN
Status: DISCONTINUED | OUTPATIENT
Start: 2017-07-11 | End: 2017-07-26 | Stop reason: HOSPADM

## 2017-07-11 RX ORDER — LANOLIN ALCOHOL/MO/W.PET/CERES
3 CREAM (GRAM) TOPICAL
Status: DISCONTINUED | OUTPATIENT
Start: 2017-07-11 | End: 2017-07-13

## 2017-07-11 RX ADMIN — ACAMPROSATE CALCIUM 666 MG: 333 TABLET, DELAYED RELEASE ORAL at 08:15

## 2017-07-11 RX ADMIN — HYDROXYZINE HYDROCHLORIDE 50 MG: 50 TABLET, FILM COATED ORAL at 08:15

## 2017-07-11 RX ADMIN — OLANZAPINE 10 MG: 10 TABLET, FILM COATED ORAL at 22:10

## 2017-07-11 RX ADMIN — ACAMPROSATE CALCIUM 666 MG: 333 TABLET, DELAYED RELEASE ORAL at 15:34

## 2017-07-11 RX ADMIN — ACAMPROSATE CALCIUM 666 MG: 333 TABLET, DELAYED RELEASE ORAL at 20:39

## 2017-07-11 RX ADMIN — HYDROXYZINE HYDROCHLORIDE 50 MG: 50 TABLET, FILM COATED ORAL at 18:01

## 2017-07-11 ASSESSMENT — ACTIVITIES OF DAILY LIVING (ADL)
DRESS: SCRUBS (BEHAVIORAL HEALTH);INDEPENDENT
GROOMING: HANDWASHING;INDEPENDENT
ORAL_HYGIENE: INDEPENDENT
GROOMING: INDEPENDENT
DRESS: INDEPENDENT
ORAL_HYGIENE: INDEPENDENT
LAUNDRY: WITH SUPERVISION

## 2017-07-11 NOTE — PROGRESS NOTES
LADONNA called [facility] pre-petition, [phone]. Left message stating that a petition is being made on pt.     Call from Chaya in pre-petition. They have an address in [place]. LADONNA advised concerning hospital address in [place]; also advised pt is living in her car. Chaya searched; pt is getting medical assistance from [facility] so is their responsibility.

## 2017-07-11 NOTE — PROGRESS NOTES
Received letter of feliciano from pt. Letter placed in chart. Called supervisor, Tamra, [id number]. Left message that the letter has been received and is in the chart. Patient relations has met with pt.

## 2017-07-11 NOTE — PROGRESS NOTES
CTC called [facility] court, [phone].  Transferred to  [facility] wheeler, [phone]. Spoke with Mallory. Advised  that pt will not be at the hearing today.    [facility] called [facility] CPS, [phone]. Asked to be connected to Mandy Cassidy. Given office [phone] and [phone] (cell).    [facility] called the cell number, left message regarding pt's being admitted and not able to attend court. Pt did sign ADDISON.     Call back from Mandy; they will reschedule the court hearing.

## 2017-07-11 NOTE — PLAN OF CARE
Problem: General Plan of Care (Inpatient Behavioral)  Goal: Team Discussion  Team Plan:   BEHAVIORAL TEAM DISCUSSION     Participants: Kathy Oliver, NP    Elenita Ruano, MS,LP   Carmen Peck, RN,   Armando Taylor, Psych associate     Progress: Pt newly admitted, highly agitated and demanding to leave.   Continued Stay Criteria/Rationale: Pt insight and judgement are impaired, she remains manic and agitated  Medical/Physical: see chart  Precautions:   Behavioral Orders   Procedures     Code 1 - Restrict to Unit     Elopement precautions     Petition for commitment       [employer]     Routine Programming       As clinically indicated     Status 15       Every 15 minutes.     Suicide precautions     Plan: A petition for commitment has been sent. Pt to stabilize on medication, participate in milieu.   Rationale for change in precautions or plan: no changes at this time

## 2017-07-11 NOTE — PROGRESS NOTES
07/11/17 1400   Significant Event   Significant Event (pt remains upset regarding hospitalization)     Multiple staff and pt. relations tried to assist pt. with many issues surrounding her hospitalization. She often became agitated when her requests to leave this unit weren't met. Pt yelled several times but seemed to be able to redirect and deescalate, she is currently speaking with prepetition screening re: possible commitment hearing.

## 2017-07-11 NOTE — PROGRESS NOTES
Patient approached this writer and demanded to see a doctor because she said that she signed voluntarily and that she is not on 72-hour hold. Despite of the explanation done by this writer and the charge nurse, patient was very persistent. Writer offered medication, the Zyprexa, but patient declined and kept saying that she wanted to talk to somebody higher than this writer. Evening Nurse supervisor paged to notify of the patient's request and updated.    Eventually after few minutes, patient took the Zyprexa 10 mgs given orally as PRN for agitation.

## 2017-07-11 NOTE — PROGRESS NOTES
CTC met with pt, gave her a copy of the 72 hour hold and attempted to explain what this means. Explained to pt that the hold was placed yesterday and does not  until  at 1305. Pt fairly argumentative. Finally stated that she had her , was talking to patient relations and was going to meet with Tamra.    Pt also demanding a new provider. CTC explained that this goes through her current provider; pt states that she already demanded that. [facility] explained the process, that the provider will make the request but likely she won't see anyone until tomorrow.

## 2017-07-11 NOTE — PROGRESS NOTES
This staff was given approval to look up a phone number for Ricky Maradiaga. Pt was wanting to get in touch with the individual so her car would not be towed. Charge nurse approved this writer to utilize [employer]/internet to look up the number but the name was not listed. Pt asked to use Facebook but the request was denied. This staff encouraged pt to call a family member or friend to help get on her Facebook.
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
## 2017-07-11 NOTE — PROGRESS NOTES
"At nursing station making \"rapid fire\" demands and accusing staff of being \"snotty\" and not giving good patient care.  Asked when her M.D. would be in and insisted that she would be leaving today.  Wanted her cell phone and advised that unit phones will be available at 0715.  When her demands were not met she then became intrusive--asking very personal questions.  She was asked to step away from the desk when her she became insulting.  Said that she will be talking to my supervisor, \"Tamra.\"   "

## 2017-07-11 NOTE — PLAN OF CARE
"Problem: General Plan of Care (Inpatient Behavioral)  Goal: Individualization/Patient Specific Goal (IP Behavioral)  The patient and/or their representative will achieve their patient-specific goals related to the plan of care.    The patient-specific goals include:   Illness Management Recovery model: Objectives     Patient will identify reason(s) for hospitalization from their perspective.  Patient will identify a minimum of three goals for discharge.  Patient will identify a minimum of three triggers that may increase their symptoms.  Patient will identify a minimum of three coping skills they can do to stay well.  Patient will identify their support system to demonstrate readiness for discharge.     Illness Management Recovery model:  Triggers.      Patient identified the following triggers which may exacerbate their illness:     1. \"Thinking about my son\".  2.   3.           "

## 2017-07-12 PROCEDURE — 25000132 ZZH RX MED GY IP 250 OP 250 PS 637: Performed by: NURSE PRACTITIONER

## 2017-07-12 PROCEDURE — 99232 SBSQ HOSP IP/OBS MODERATE 35: CPT | Performed by: NURSE PRACTITIONER

## 2017-07-12 PROCEDURE — 99232 SBSQ HOSP IP/OBS MODERATE 35: CPT | Performed by: PSYCHIATRY & NEUROLOGY

## 2017-07-12 PROCEDURE — 90853 GROUP PSYCHOTHERAPY: CPT

## 2017-07-12 PROCEDURE — 12400007 ZZH R&B MH INTERMEDIATE UMMC

## 2017-07-12 PROCEDURE — H2032 ACTIVITY THERAPY, PER 15 MIN: HCPCS

## 2017-07-12 PROCEDURE — 25000125 ZZHC RX 250: Performed by: CLINICAL NURSE SPECIALIST

## 2017-07-12 RX ADMIN — HYDROXYZINE HYDROCHLORIDE 50 MG: 50 TABLET, FILM COATED ORAL at 19:15

## 2017-07-12 RX ADMIN — BACITRACIN: 500 OINTMENT TOPICAL at 17:20

## 2017-07-12 RX ADMIN — TRAZODONE HYDROCHLORIDE 50 MG: 50 TABLET ORAL at 20:27

## 2017-07-12 RX ADMIN — HYDROXYZINE HYDROCHLORIDE 50 MG: 50 TABLET, FILM COATED ORAL at 10:01

## 2017-07-12 RX ADMIN — Medication 1 LOZENGE: at 11:09

## 2017-07-12 RX ADMIN — ACETAMINOPHEN 650 MG: 325 TABLET, FILM COATED ORAL at 23:50

## 2017-07-12 RX ADMIN — ACAMPROSATE CALCIUM 666 MG: 333 TABLET, DELAYED RELEASE ORAL at 14:02

## 2017-07-12 RX ADMIN — ACAMPROSATE CALCIUM 666 MG: 333 TABLET, DELAYED RELEASE ORAL at 08:11

## 2017-07-12 RX ADMIN — Medication 1 LOZENGE: at 17:20

## 2017-07-12 RX ADMIN — Medication 1 LOZENGE: at 08:11

## 2017-07-12 RX ADMIN — ACAMPROSATE CALCIUM 666 MG: 333 TABLET, DELAYED RELEASE ORAL at 19:15

## 2017-07-12 RX ADMIN — Medication 1 LOZENGE: at 19:15

## 2017-07-12 RX ADMIN — OLANZAPINE 10 MG: 10 TABLET, FILM COATED ORAL at 15:20

## 2017-07-12 RX ADMIN — Medication 1 LOZENGE: at 14:03

## 2017-07-12 RX ADMIN — MELATONIN TAB 3 MG 3 MG: 3 TAB at 23:50

## 2017-07-12 ASSESSMENT — ACTIVITIES OF DAILY LIVING (ADL)
DRESS: INDEPENDENT
LAUNDRY: WITH SUPERVISION
GROOMING: INDEPENDENT
ORAL_HYGIENE: INDEPENDENT
ORAL_HYGIENE: INDEPENDENT
GROOMING: INDEPENDENT

## 2017-07-12 NOTE — PROGRESS NOTES
"[facility]   Psychiatric Second opinion         Interim History and Subjective Reports:    I was asked to see the patient for a second opinion regarding her care plan after she requested a change in providers.    The patient explains \"I was just really upset\" as her reasoning for requesting a change in provider. The intensity of her emotional distress has since subsided and she is more willing to cooperate with her plan of care. She is no longer requesting a change in provider.     She spent some time reviewing the relationship difficulty she has had with her parents which has complicated custody of her son. Her son is currently in foster care in a location that is unknown to her which is understandably difficult for her to tolerate. She maintains hope that through cooperating with her plan of care and accepting treatment interventions, she will be reunited with her son soon.     She is aware that her petition for commitment has been supported and that a court date will soon be provided to her for an examination and later a hearing.     She reported high anxiety however manageable. Mood is sad which he relates directly to psychosocial stressors. She is looking forward to restarting her Effexor soon noting positive response in the past when compliant. She is looking forward to maintaining sobriety. No psychotic symptoms reported. She denied suicidal and homicidal thoughts.     She did inquire if a family member can bring her a pair of sweat pants to wear on the unit. She also reported that she needs to pay a few utility bills however her cell phone battery is dead. She asked if she could have access to a computer to complete this task.         Scheduled Medications:       acamprosate  666 mg Oral TID          Allergies:    No Known Allergies       Labs:   No results found for this or any previous visit (from the past 24 hour(s)).       Vitals:   /90  Pulse 105  Temp 97.5 " " F (36.4  C)  Resp 16  Ht 1.676 m (5' 6\")  Wt 74.3 kg (163 lb 11.2 oz)  BMI 26.42 kg/m2  Weight: 163 lbs 11.2 oz          Height: 5' 6\"           Body mass index is 26.42 kg/(m^2).        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 The patient is no longer requesting a change in provider and is happy to continue working with her current treatment team. She responded well to active and empathic listening during our meeting today. She tells me she is willing to cooperate with exploring MICD treatment options whether residential or intensive outpatient. She is open to a chemical health assessment to further explore an appropriate referral.    2.  I will discontinue elopement precautions so that the patient may wear more comfortable clothing on the unit. She seems to be willing to engage in her plan of care and appreciates the repercussions of not doing so. I will also allow her to use the computer one time at staff's discretion so that she may pay utility bills that need to be addressed. These interventions will hopefully further promote her willingness to continue cooperating with her plan of care.    3.  She reported a plan of " restarting Effexor tomorrow to further address ongoing mood and anxiety symptoms. I would also support the decision to restart Effexor at this time as the acute effects of substances on her mood appear to be subsiding.    4.  In summary, I would support her current plan of care and encouraged the patient's cooperation.

## 2017-07-12 NOTE — PROGRESS NOTES
Pt requested forms for OFP for [facility]. [facility] downloaded and printed direction and form for pt.
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
## 2017-07-12 NOTE — DISCHARGE INSTRUCTIONS
Behavioral Discharge Planning and Instructions    Summary: You were admitted to station 32 from [facility] on a 72 hour hold. You were agitated and psychotic in context of using methamphetamine. A petition for commitment was made and supported.   You were dually committed to the [facility] on 7/21/2017. You have agreed to a Stay of Commitment and are being discharged to treatment. The Stay of Commitment will remain in effect for the duration of the Commitment which expires on 1/19/2018.   You are being discharged to chemical dependency treatment and sober housing.         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 Direct care was provided by unit nurses and staff. You met with case management. You had opportunities to participate in therapeutic groups on the unit. At this time you report your mood has stabilized and you report you are not having thoughts or intent to harm yourself or others. You will be discharged to chemical dependency treatment and sober housing.      Symptoms to Report: feeling more aggressive, increased confusion, losing more sleep, mood getting worse, thoughts of suicide or use of drugs or alcohol    Lifestyle Adjustment: Take medications only as prescribed. Do not use alcohol or illicit drugs. Attend all scheduled appointments with your outpatient providers. Call at least 24 hours in advance if you need to reschedule an appointment to ensure continued access to your outpatient providers. Contact the appropriate professional or hotline listed below as needed for support if your symptoms continue, or call 9-1-1 in  an emergency.       Psychiatry Follow-up:   You are being discharged to chemical dependency treatment at [facility]. Your intake is at 10 am on Thursday 7/26.    You will be residing at [facility] at [address] [place] [id number]    Please follow up with your primary care provider at [facility] in [place]  [address] [facility]  Phone: [phone]  Fax: [phone]    Your child protection worker is Mandy Cassidy. Office [phone]; [phone] (cell)  Fax: [phone]  A referral for [facility] case management services was made to [facility]. [address] [place] [address]  WOODY Mercedes, [facility], Director  (direct) [phone] (fax) [phone]        Resources:   Crisis Intervention: 952.123.3463 or 112-364-2890 (TTY: 181.326.1780).  Call anytime for help.  National [place] on Mental Illness (www.[place].winifred.org): 731.546.3626 or 992-455-0498.  Alcoholics Anonymous (www.alcoholics-anonymous.org): Check your phone book for your local chapter.  Suicide Awareness Voices of Education (SAVE) (www.save.org): 262-256-IRYM (8588)  National Suicide Prevention Line (www.mentalhealthmn.org): 997-041-WEWV (6669)  Mental Health Consumer/Survivor Network of [place] (www.mhcsn.net): 607.348.2240 or 687-676-1983  Mental Health Association of [place] (www.mentalhealth.org): 536.288.5299 or 253-471-1633    General Medication Instructions:   See your medication sheet(s) for instructions.   Take all medicines as directed.  Make no changes unless your doctor suggests them.   Go to all your doctor visits.  Be sure to have all your required lab tests. This way, your medicines can be refilled on time.  Do not use any drugs not prescribed by your doctor.  Avoid alcohol.    The treatment team has appreciated the opportunity to work with you.  We wish you the best in the future.  If you have any questions or concerns our unit number is [phone].
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
## 2017-07-12 NOTE — PROGRESS NOTES
"A woman who calls herself a \"family friend\" of patient called and told this wrier that patient is calling family and friends and \"harassing\" them, and that she has been making these phone calls all day. Patient has been observed to be on the phone but staff have not heard content of these calls. Writer asked caller to call back if the phone calls continue.   "

## 2017-07-12 NOTE — PROGRESS NOTES
07/12/17 1400   Behavioral Health   Hallucinations denies / not responding to hallucinations   Thinking distractable   Orientation person: oriented;place: oriented;date: oriented;time: oriented   Memory baseline memory   Insight admits / accepts   Judgement impaired   Eye Contact at examiner   Affect blunted, flat   Mood mood is calm;anxious   Physical Appearance/Attire attire appropriate to age and situation;neat   Hygiene well groomed   Suicidality other (see comments)  (Pt denies)   Self Injury other (see comment)  (Pt denies)   Elopement Statements about wanting to leave   Activity restless   Speech clear;coherent   Medication Sensitivity no stated side effects;no observed side effects   Psychomotor / Gait balanced;steady   Activities of Daily Living   Hygiene/Grooming independent   Oral Hygiene independent   Dress independent   Laundry with supervision   Room Organization independent   Behavioral Health Interventions   Depression maintain safety precautions;maintain safe secure environment;encourage nutrition and hydration;encourage participation / independence with adls;provide emotional support;establish therapeutic relationship;assist with developing and utilizing healthy coping strategies;build upon strengths   Social and Therapeutic Interventions (Depression) encourage socialization with peers;encourage effective boundaries with peers;encourage participation in therapeutic groups and milieu activities     Pt denies SI/SIB and denies hallucinations. She has been attending and participating in groups throughout the day shift. She reports anxiety and rates it at a 7/10 and her depression at a 2/10. Pt states that she is ready for her discharge and her goal for the day is to complete her discharge summary. No other comments/concerns.

## 2017-07-12 NOTE — PROGRESS NOTES
Call from Jorge in pre-petition. They are supporting the petition. Won't get to the  until tomorrow for the court hold.     CTC advised pt, she was calm, asked about the process which CTC explained. Pt then called the pre-petition screener.

## 2017-07-12 NOTE — PLAN OF CARE
"Problem: Depressive Symptoms  Goal: Depressive Symptoms  Signs and symptoms of listed problems will be absent or manageable.     Patient will be free of suicidal thoughts and s/s of depression.  Patient will take her medications at 100% compliance.  Patient will actively participate in group activities programmed by the unit.  Patient will identify at least 3 coping skills to relieve or reduce s/s of depression.  Patient will verbalize readiness for discharge.  Patient will sleep at least 7 hours at night and takes a nap at least 20 mins during the day.  Patient will socialize or interact with staff and peers.  Upon discharge, patient will verbalize utilization of coping skills she has identified.   Outcome: Declining  Patient was agitated and angry at beginning of shift, continuing to insist loudly that she was not on a 72 hour hold.  After she met with unit manager, patient was informed by writer that [facility] ED had reported to day shift staff that patient had been making multiple abusive phone calls to them.  She was informed that she was not allowed to call them again, and that her phone privileges would be restricted by provider if she did so.  (See new order from provider).  She accepted this news calmly.  Patient later asked to get a number off facebook and writer informed her she would be supervised by a psychiatric technician and could only get a number.  Several hours later writer received a phone call from one of patient's facebook friends who reported patient had made multiple posts on social media about being hospitalized this evening.  Patient insisted staff had given her permission to do this, and became agitated when informed that this was an untrue statement.  Patient was verbally abusive to staff, continued to insist her hold is up tomorrow and stated \"I know more than all of you.  I have a masters degree\" and then demanded to know educational qualifications of staff present at that time.  She " "then stated staff were all looking at her facebook profile \"which is a violation of my privacy\" and refused to accept reality orientation that all staff computers were being used only for EPIC patient care.  She later took a prn zyprexa.      "

## 2017-07-13 ENCOUNTER — MEDICAL CORRESPONDENCE (OUTPATIENT)
Dept: HEALTH INFORMATION MANAGEMENT | Facility: CLINIC | Age: 27
End: 2017-07-13

## 2017-07-13 PROCEDURE — 25000132 ZZH RX MED GY IP 250 OP 250 PS 637: Performed by: NURSE PRACTITIONER

## 2017-07-13 PROCEDURE — 12400007 ZZH R&B MH INTERMEDIATE UMMC

## 2017-07-13 PROCEDURE — 99232 SBSQ HOSP IP/OBS MODERATE 35: CPT | Performed by: NURSE PRACTITIONER

## 2017-07-13 RX ORDER — VENLAFAXINE HYDROCHLORIDE 150 MG/1
150 TABLET, EXTENDED RELEASE ORAL
Status: DISCONTINUED | OUTPATIENT
Start: 2017-07-13 | End: 2017-07-26 | Stop reason: HOSPADM

## 2017-07-13 RX ORDER — LANOLIN ALCOHOL/MO/W.PET/CERES
6 CREAM (GRAM) TOPICAL
Status: DISCONTINUED | OUTPATIENT
Start: 2017-07-13 | End: 2017-07-26 | Stop reason: HOSPADM

## 2017-07-13 RX ORDER — SENNOSIDES 8.6 MG
1 TABLET ORAL 2 TIMES DAILY
Status: DISCONTINUED | OUTPATIENT
Start: 2017-07-13 | End: 2017-07-26 | Stop reason: HOSPADM

## 2017-07-13 RX ADMIN — HYDROXYZINE HYDROCHLORIDE 50 MG: 50 TABLET, FILM COATED ORAL at 19:00

## 2017-07-13 RX ADMIN — SENNOSIDES 1 TABLET: 8.6 TABLET, FILM COATED ORAL at 21:07

## 2017-07-13 RX ADMIN — Medication 1 LOZENGE: at 21:08

## 2017-07-13 RX ADMIN — ACAMPROSATE CALCIUM 666 MG: 333 TABLET, DELAYED RELEASE ORAL at 08:21

## 2017-07-13 RX ADMIN — VENLAFAXINE HYDROCHLORIDE 150 MG: 150 TABLET, FILM COATED, EXTENDED RELEASE ORAL at 08:21

## 2017-07-13 RX ADMIN — SENNOSIDES 1 TABLET: 8.6 TABLET, FILM COATED ORAL at 11:05

## 2017-07-13 RX ADMIN — HYDROXYZINE HYDROCHLORIDE 50 MG: 50 TABLET, FILM COATED ORAL at 11:03

## 2017-07-13 RX ADMIN — Medication 1 LOZENGE: at 12:22

## 2017-07-13 RX ADMIN — ACAMPROSATE CALCIUM 666 MG: 333 TABLET, DELAYED RELEASE ORAL at 15:03

## 2017-07-13 RX ADMIN — OLANZAPINE 10 MG: 10 TABLET, FILM COATED ORAL at 21:08

## 2017-07-13 RX ADMIN — ACAMPROSATE CALCIUM 666 MG: 333 TABLET, DELAYED RELEASE ORAL at 19:00

## 2017-07-13 RX ADMIN — Medication 6 MG: at 21:08

## 2017-07-13 RX ADMIN — HYDROXYZINE HYDROCHLORIDE 50 MG: 50 TABLET, FILM COATED ORAL at 15:04

## 2017-07-13 RX ADMIN — OLANZAPINE 10 MG: 10 TABLET, FILM COATED ORAL at 08:21

## 2017-07-13 RX ADMIN — Medication 1 LOZENGE: at 06:07

## 2017-07-13 ASSESSMENT — ACTIVITIES OF DAILY LIVING (ADL)
LAUNDRY: WITH SUPERVISION
HYGIENE/GROOMING: INDEPENDENT
ORAL_HYGIENE: INDEPENDENT
DRESS: INDEPENDENT
GROOMING: SHOWER;INDEPENDENT
DRESS: STREET CLOTHES
LAUNDRY: WITH SUPERVISION
ORAL_HYGIENE: INDEPENDENT

## 2017-07-13 NOTE — PROGRESS NOTES
Pt gave [facility] her combined application form for [place] and food stamps. Zaira Quintero in billing looked up pt's case number, [id number], which [facility] added to the form. [facility] faxed it to [phone].     Pt was served the OFP from her parents banning her from any sort of contact with them or her son. [facility] gave pt information about responding to an OFP. Pt also wanted another copy of applying for an OFP; [facility] provided this packet.

## 2017-07-13 NOTE — PROGRESS NOTES
LADONNA called Sadia at the [facility] Attorney's office, [phone]. It's been signed by , is being filed. She will call once the paperwork is filed. Then LADONNA can call the court for the verbal court hold, [phone]

## 2017-07-13 NOTE — PROGRESS NOTES
"Pt was a bit disorganized . She said she needs help finding a  because she has no reason to be here. Adding, \" My drug of choice is alcohol & heroin but I've been sober since Sunday so I don't need to be here.\"  She said \"her parents want to take her boy because he's so good & he's even been a model\". She abruptly got up & said this is none of your business & left the interview. Pt later turned to staff & said, \"I don't want to start another diet. This topic had never even been brought up.     07/13/17 1400   Behavioral Health   Hallucinations denies / not responding to hallucinations   Thinking confused   Orientation person: oriented   Memory short term   Insight denial of illness;poor   Judgement impaired   Eye Contact drooping   Affect irritable   Mood anxious   Hygiene well groomed   Suicidality other (see comments)  (denies)   Self Injury (none)   Elopement (no mention or behavior of E.R behavior)   Activity restless   Speech rambling;tangential   Coping/Psychosocial   Verbalized Emotional State anger;disbelief;frustration   Plan Of Care Reviewed With patient   Psycho Education   Type of Intervention 1:1 intervention   Response participates with encouragement   Hours 0.5   Treatment Detail check in   Activities of Daily Living   Hygiene/Grooming shower;independent   Oral Hygiene independent   Dress street clothes   Laundry with supervision   Room Organization independent   Activity   Activity Level of Assistance independent   Behavioral Health Interventions   Depression maintain safety precautions;maintain safe secure environment;provide emotional support   Social and Therapeutic Interventions (Depression) encourage participation in therapeutic groups and milieu activities     "

## 2017-07-13 NOTE — PROGRESS NOTES
Pt requested and given Tylenol 650 mg and Melatonin 3 mg for sleep, appeared to sleep approximately 5 hours.
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
## 2017-07-13 NOTE — PROGRESS NOTES
DIANA from Sadia at the [facility] Attorney's office, [phone]. LADONNA called back.  Needs to sign the exhibit A.  LADONNA signed, faxed to her at  [phone]

## 2017-07-13 NOTE — PROGRESS NOTES
07/13/17 1200   General Information   Date Initially Attended OT 07/13/17   Clinical Impression   Affect Excessive, manic  (initially, calmer by end of group)   Orientation Oriented to person, place and time   Appearance and ADLs General cleanliness observed in most areas   Attention to Internal Stimuli No observed signs   Interaction Skills Interacts appropriately with staff;Interacts appropriately with peers   Ability to Communicate Needs Does so with prompts   Verbal Content Appropriate to topic   Ability to Maintain Boundaries Maintains appropriate physical boundaries;Maintains appropriate verbal boundaries   Participation Independently participates   Concentration Concentrates 20-30 minutes   Ability to Concentrate With structure   Follows and Comprehends Directions Other (see comments)  (initially needed prompts)   Memory Needs further assessment   Organization Independently organizes simple tasks   Decision Making Impulsive   Planning and Problem Solving Impulsive  (able to do so with prompts)   Ability to Apply and Learn Concepts Applies within group structure   Frustrations / Stress Tolerance Needs further assessment   Level of Insight Needs further assessment   Self Esteem Needs further assessment   Social Supports Needs further assessment  (listed multiple supports)   General Observation/Plan   General Observations/Plan See Comments     INITIAL O.T. ASSESSMENT Details:  Attended 1.50 of 2.0 possible O.T. Groups today, several since admission. Initially rather pressured, impulsive, rapidly looking through cabinets. Was redirected to  written Self Assessment. OT staff reviewed with patient and explained the value of having them involved in their treatment plan and provided options to meet their current needs/self identified goals.Please see Progress Note section of paper chart for form. Did not identify reason for admission. Feelings identified include: anxiety, anger, helpless. Describes thoughts as  negative, trouble concentrating, nightmares, obsessive.  Multiple goals selected, did not prioritize as per written direction.  Needed prompts to plan ahead on task, once structured did slow down a bit. Made plaque with Serenity Prayer on it. Plan: Pt will be provided with structured activity/tasks, to maximize cognitive function. Will be encouraged to follow 1 step direction, plan ahead, organize approach to task. Redirect as needed. Pt will explore and practice coping skills to reduce and manage stressors in daily life.
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
## 2017-07-13 NOTE — PROGRESS NOTES
Pt requested and received vistaril 50 mg's for anxiety at 1106 and 1500 with some help. Pt requested and received prn Zyprexa 10 mg's at 0821 with good effect.

## 2017-07-13 NOTE — PROGRESS NOTES
Received note from RN, Angelita Berg, that the verbal order for a court hold was received. The hold has been entered.     CTC advised pt who refused to accept this, stated she wanted to see it in writing. CTC reminded her of earlier discussion when CTC advised her that there is often a verbal order prior to receiving the written order. Pt remained upset, was given number to mental health court to confirm.

## 2017-07-14 PROCEDURE — 25000132 ZZH RX MED GY IP 250 OP 250 PS 637: Performed by: NURSE PRACTITIONER

## 2017-07-14 PROCEDURE — 90853 GROUP PSYCHOTHERAPY: CPT

## 2017-07-14 PROCEDURE — 99232 SBSQ HOSP IP/OBS MODERATE 35: CPT | Performed by: NURSE PRACTITIONER

## 2017-07-14 PROCEDURE — 12400007 ZZH R&B MH INTERMEDIATE UMMC

## 2017-07-14 RX ADMIN — ACAMPROSATE CALCIUM 666 MG: 333 TABLET, DELAYED RELEASE ORAL at 15:08

## 2017-07-14 RX ADMIN — ACAMPROSATE CALCIUM 666 MG: 333 TABLET, DELAYED RELEASE ORAL at 19:39

## 2017-07-14 RX ADMIN — Medication 1 LOZENGE: at 20:44

## 2017-07-14 RX ADMIN — HYDROXYZINE HYDROCHLORIDE 50 MG: 50 TABLET, FILM COATED ORAL at 08:13

## 2017-07-14 RX ADMIN — SENNOSIDES 1 TABLET: 8.6 TABLET, FILM COATED ORAL at 08:14

## 2017-07-14 RX ADMIN — Medication 1 LOZENGE: at 08:13

## 2017-07-14 RX ADMIN — Medication 6 MG: at 20:44

## 2017-07-14 RX ADMIN — HYDROXYZINE HYDROCHLORIDE 50 MG: 50 TABLET, FILM COATED ORAL at 04:39

## 2017-07-14 RX ADMIN — Medication 1 LOZENGE: at 18:16

## 2017-07-14 RX ADMIN — HYDROXYZINE HYDROCHLORIDE 50 MG: 50 TABLET, FILM COATED ORAL at 19:40

## 2017-07-14 RX ADMIN — ACAMPROSATE CALCIUM 666 MG: 333 TABLET, DELAYED RELEASE ORAL at 08:13

## 2017-07-14 RX ADMIN — SENNOSIDES 1 TABLET: 8.6 TABLET, FILM COATED ORAL at 20:44

## 2017-07-14 RX ADMIN — OLANZAPINE 10 MG: 10 TABLET, FILM COATED ORAL at 12:20

## 2017-07-14 RX ADMIN — ACETAMINOPHEN 650 MG: 325 TABLET, FILM COATED ORAL at 19:40

## 2017-07-14 RX ADMIN — HYDROXYZINE HYDROCHLORIDE 50 MG: 50 TABLET, FILM COATED ORAL at 15:08

## 2017-07-14 RX ADMIN — VENLAFAXINE HYDROCHLORIDE 150 MG: 150 TABLET, FILM COATED, EXTENDED RELEASE ORAL at 08:17

## 2017-07-14 ASSESSMENT — ACTIVITIES OF DAILY LIVING (ADL)
GROOMING: INDEPENDENT
ORAL_HYGIENE: INDEPENDENT
DRESS: INDEPENDENT
GROOMING: INDEPENDENT
LAUNDRY: WITH SUPERVISION
ORAL_HYGIENE: INDEPENDENT
DRESS: STREET CLOTHES

## 2017-07-14 NOTE — PROGRESS NOTES
Pt was irritable, requesting she leave to go get her son. Her parents have custody of pt.'s son. SHe was served commitment papers today & served order of protection papers yesterday. Pt does not comprehend her legal situation & insists on leaving & taking her son. Pt is on phone restriction. SHe can make 2 10 minute calls per day. No call to parents who have an order of protection against her. She attends groups & is visible in milieu much of day.     07/14/17 1500   Behavioral Health   Hallucinations denies / not responding to hallucinations   Thinking confused   Orientation person: oriented   Memory confabulation   Insight denial of illness;poor   Judgement impaired   Affect tense;irritable   Mood irritable   Hygiene well groomed   Suicidality other (see comments)  (social, attends some groups)   Self Injury other (see comment)  (none)   Elopement (not making statements re elpoement , but wants to leave)   Activity restless   Speech rambling;tangential   Medication Sensitivity no stated side effects;no observed side effects   Coping/Psychosocial   Verbalized Emotional State disbelief;frustration   Plan Of Care Reviewed With patient   Psycho Education   Type of Intervention 1:1 intervention   Response participates with encouragement   Hours 0.5   Activities of Daily Living   Hygiene/Grooming independent   Oral Hygiene independent   Dress street clothes   Laundry with supervision   Room Organization independent   Behavioral Health Interventions   Depression maintain safe secure environment;provide emotional support   Social and Therapeutic Interventions (Depression) encourage participation in therapeutic groups and milieu activities

## 2017-07-14 NOTE — PROGRESS NOTES
[facility] called mental health court, [phone]. Was advised that the deputies have the paperwork and should be delivering it today.

## 2017-07-14 NOTE — PROGRESS NOTES
Pt asked about paying bills online. States she has to pay her direct TV, [employer] and son's . [facility] asked about these bills as our information is that pt is living in her car. Pt denies this. [facility] advised will discuss this in team but that if she were allowed computer access it would be monitored.     Pt also asked about a transfer to [facility]. She apparently called them and they said they would need to hear from her treatment team. [facility] explained that a lateral transfer to the same level of care is highly unlikely. Also advised that, since she is in the midst of commitment, another facility would not be willing to take her until that issue is resolved.     Pt then asked about transfer to another unit. [facility] explained that her provider, Kathy Oliver NP, has already made the request for a second opinion from Dr. Salcedo and alerted him that pt would like a transfer.    [facility] called pt's CPS worker, Mandy Mendezmagdalene.  [phone] (cell).  Left message asking if pt were actually paying her son's . Also asked for surety that pt is not living at the address she gave as our information is that she is homeless based on assumption that pt would not have wireless or tv bills to pay if she were homeless.

## 2017-07-14 NOTE — PROGRESS NOTES
07/13/17 2111   Behavioral Health   Hallucinations denies / not responding to hallucinations   Thinking intact   Orientation person: oriented;place: oriented;date: oriented;time: oriented   Memory baseline memory   Insight poor   Judgement impaired   Affect full range affect   Mood mood is calm   Physical Appearance/Attire appears stated age;flamboyant   Hygiene well groomed   Suicidality other (see comments)  (denies)   Self Injury other (see comment)  (denies)   Elopement (not at risk)   Activity restless;hyperactive (agitated, impulsive)  (able to keep self control)   Speech clear;coherent   Medication Sensitivity no observed side effects   Psychomotor / Gait balanced;steady   Psycho Education   Type of Intervention 1:1 intervention   Response participates, initiates socially appropriate   Hours 0.5   Treatment Detail receiving feedback   Activities of Daily Living   Hygiene/Grooming independent   Oral Hygiene independent   Dress independent   Laundry with supervision   Room Organization independent     Patient was social, but mischievous re: phone use. Did respond with anxiety after a milieu conflict and required verbal de escalation. Did require some extra attention but appears to be applying good coping skills.
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
## 2017-07-14 NOTE — PROGRESS NOTES
served court papers to pt. Copy given to staff which was placed in chart. Pt has court exam 7/18 at 10:30. Court date is 7/21.      for pt is Jessenia Clayton at [phone]

## 2017-07-15 PROCEDURE — 25000132 ZZH RX MED GY IP 250 OP 250 PS 637: Performed by: NURSE PRACTITIONER

## 2017-07-15 PROCEDURE — 12400007 ZZH R&B MH INTERMEDIATE UMMC

## 2017-07-15 PROCEDURE — 99222 1ST HOSP IP/OBS MODERATE 55: CPT | Performed by: NURSE PRACTITIONER

## 2017-07-15 PROCEDURE — 99207 ZZC CONSULT E&M CHANGED TO INITIAL LEVEL: CPT | Performed by: NURSE PRACTITIONER

## 2017-07-15 RX ADMIN — OLANZAPINE 10 MG: 10 TABLET, FILM COATED ORAL at 08:27

## 2017-07-15 RX ADMIN — ACETAMINOPHEN 650 MG: 325 TABLET, FILM COATED ORAL at 14:46

## 2017-07-15 RX ADMIN — HYDROXYZINE HYDROCHLORIDE 50 MG: 50 TABLET, FILM COATED ORAL at 12:29

## 2017-07-15 RX ADMIN — OLANZAPINE 10 MG: 10 TABLET, FILM COATED ORAL at 21:23

## 2017-07-15 RX ADMIN — DICLOFENAC 2 G: 10 GEL TOPICAL at 21:23

## 2017-07-15 RX ADMIN — Medication 6 MG: at 21:23

## 2017-07-15 RX ADMIN — Medication 7.5 MG: at 11:26

## 2017-07-15 RX ADMIN — SENNOSIDES 1 TABLET: 8.6 TABLET, FILM COATED ORAL at 08:27

## 2017-07-15 RX ADMIN — HYDROXYZINE HYDROCHLORIDE 50 MG: 50 TABLET, FILM COATED ORAL at 04:57

## 2017-07-15 RX ADMIN — ACETAMINOPHEN 650 MG: 325 TABLET, FILM COATED ORAL at 08:27

## 2017-07-15 RX ADMIN — ACAMPROSATE CALCIUM 666 MG: 333 TABLET, DELAYED RELEASE ORAL at 19:03

## 2017-07-15 RX ADMIN — SENNOSIDES 1 TABLET: 8.6 TABLET, FILM COATED ORAL at 19:03

## 2017-07-15 RX ADMIN — Medication 7.5 MG: at 19:03

## 2017-07-15 RX ADMIN — ACAMPROSATE CALCIUM 666 MG: 333 TABLET, DELAYED RELEASE ORAL at 14:46

## 2017-07-15 RX ADMIN — VENLAFAXINE HYDROCHLORIDE 150 MG: 150 TABLET, FILM COATED, EXTENDED RELEASE ORAL at 08:27

## 2017-07-15 RX ADMIN — Medication 1 LOZENGE: at 21:23

## 2017-07-15 RX ADMIN — HYDROXYZINE HYDROCHLORIDE 50 MG: 50 TABLET, FILM COATED ORAL at 16:30

## 2017-07-15 RX ADMIN — ACAMPROSATE CALCIUM 666 MG: 333 TABLET, DELAYED RELEASE ORAL at 08:27

## 2017-07-15 RX ADMIN — Medication 1 LOZENGE: at 04:57

## 2017-07-15 RX ADMIN — OLANZAPINE 10 MG: 10 TABLET, FILM COATED ORAL at 01:20

## 2017-07-15 ASSESSMENT — ACTIVITIES OF DAILY LIVING (ADL)
ORAL_HYGIENE: INDEPENDENT
GROOMING: HANDWASHING;SHOWER;INDEPENDENT
DRESS: STREET CLOTHES;SCRUBS (BEHAVIORAL HEALTH);INDEPENDENT
LAUNDRY: WITH SUPERVISION

## 2017-07-15 NOTE — CONSULTS
"  Internal Medicine Consult - Initial Visit       Mallory Jaramillo MRN# [id number]   YOB: 1990 Age: 27 year old   Date of Admission: 7/10/2017  PCP: Gokul Adams  Date of Service: 7/15/2017    Referring Provider: Pablo Raymond MD  Reason for Consult: R foot pain         Assessment and Recommendations:   Mallory Jaramillo is a 27 year old female with a history of substance abuse, PTSD, anxiety, and depression admitted for agitation and psychosis.     # Agitation, psychosis, anxiety, depression - Management per Psychiatry     # R ankle pain - Trace swelling along lateral malleolus.  Reports that she has fractured her ankle \"multiple times\" and states that her right ankle has become increasingly more painful over the last 3 days.  Uses ankle swede strap at home.  Denies recent falls, trauma, twists, or sprains while on the unit.  No visible bruising, discoloration, or coolness. Three-view xray of R ankle on 6/15/17 at [facility] shows normal anatomic alignment, no acute fractures, osseous abnormalities, and intact talar dome and ankle mortise.      - Voltaren gel PRN, flexeril 7.5mg TID PRN   - Would avoid flexeril at discharge   - Ordered ankle brace for support  - Continue supportive care with elevation, heat/ice as needed   - Monitor for new bruising, worsening swelling, discoloration, or temperature changes     # Substance abuse - Utox this admission positive for amphetamines.  Hx of alcohol abuse.     - Continue Campral TID     Medicine will sign off, no further recommendations at this time.  Please feel free to reconsult if patient's symptoms worsen or if new problems arise.  Thank you for this consult.    Lisandra Mtz CNP  Hospitalist Service   Pager: [phone]       Reason for Visit:   Patient admitted for agitation and psychosis, medicine consulted for R foot pain         History of Present Illness:   History is obtained from the patient and medical record.     This " patient is a 27 year old female with a history of substance abuse, PTSD, anxiety, and depression admitted for agitation and psychosis.     Brief Hospital Course: Patient was admitted from [facility] for agitation and psychosis.  Utox positive for amphetamines.  Patient was not been prescribed amphetamines or stimulants prior to admission.  She has been through 3-4 CD treatment programs in the last 1.5 years per chart review.      Internal Medicine service was asked to see patient for R foot pain.  The patient states that she has fractured her R ankle multiple times and often has pain and swelling.  She denies recent trauma, falls, and numbnes/paresthesias.  She denies difficulty with ambulation.  She reports that elevation, ice, and tylenol have not been effective and that the pain has worsened over the last 2-3 days.  She reports that she uses an ankle brace/stabilizer at home which is helpful.  She denies fevers, chills, chest pain, dyspnea, abdominal pain, nausea, vomiting, and urinary symptoms.            Review of Systems:   A 10 point ROS was performed and negative unless otherwise noted in HPI.           Past Medical History:   Reviewed and updated in Epic.  Past Medical History:   Diagnosis Date     Anxiety      Depressive disorder      ETOH abuse      PTSD (post-traumatic stress disorder)              Past Surgical History:   Reviewed and updated in Epic.  No past surgical history on file.          Social History:   Reviewed and updated in [employer].  Social History     Social History     Marital status: Single     Spouse name: N/A     Number of children: N/A     Years of education: N/A     Occupational History     Not on file.     Social History Main Topics     Smoking status: Current Every Day Smoker     Packs/day: 1.00     Smokeless tobacco: Not on file     Alcohol use No     Drug use: No     Sexual activity: Not on file     Other Topics Concern     Not on file     Social History Narrative               "Family History:   Reviewed and updated in Epic.  Family History   Problem Relation Age of Onset     Colon Cancer Maternal Grandmother      Melanoma Maternal Grandmother              Allergies:   No Known Allergies          Medications:     Current Facility-Administered Medications   Medication     cyclobenzaprine (FLEXERIL) half-tab 7.5 mg     venlafaxine (EFFEXOR-ER) 24 hr tablet 150 mg     melatonin tablet 6 mg     sennosides (SENOKOT) tablet 1 tablet     magnesium hydroxide (MILK OF MAGNESIA) suspension 30 mL     benzocaine-menthol (CHLORASEPTIC) 6-10 MG lozenge 1 lozenge     bacitracin ointment     acamprosate (CAMPRAL) EC tablet 666 mg     hydrOXYzine (ATARAX) tablet 50 mg     acetaminophen (TYLENOL) tablet 650 mg     alum & mag hydroxide-simethicone (MYLANTA ES/MAALOX  ES) suspension 30 mL     bisacodyl (DULCOLAX) Suppository 10 mg     OLANZapine (zyPREXA) tablet 10 mg    Or     OLANZapine (zyPREXA) injection 10 mg            Physical Exam:   Blood pressure 113/83, pulse 87, temperature 97.4  F (36.3  C), temperature source Oral, resp. rate 16, height 1.676 m (5' 6\"), weight 74.2 kg (163 lb 8 oz).  Body mass index is 26.39 kg/(m^2).  GENERAL: Alert and oriented x 3. Well nourished, well developed.  No acute distress.   HEENT: Anicteric sclera. PERRL. Mucous membranes moist and without lesions.   CV: RRR. S1, S2. No murmurs appreciated.   RESPIRATORY: Effort normal on room air. Lungs CTAB with no wheezing, rales, rhonchi.   GI: Abdomen soft and non distended, bowel sounds present. No tenderness, rebound, guarding.   MUSCULOSKELETAL: No joint swelling or tenderness. Moves all extremities.   NEUROLOGICAL: No focal deficits. Strength 5/5 bilaterally in upper and lower extremities. Follows commands.   EXTREMITIES: No peripheral edema. Intact bilateral pedal pulses. Limited ROM with rotation, flexion, and extension with R foot, ankle.    SKIN: No jaundice. No rashes. Trace swelling around R ankle.  No bruising, " erythema, discoloration of R foot.           Data:   I personally reviewed the following studies:    ROUTINE IP LABS (Last four results)  CMP   Recent Labs  Lab 07/11/17  0807 07/09/17  1340    135   POTASSIUM 4.0 3.0*   CHLORIDE 105 99   CO2 25 29   ANIONGAP 10 7   * 86   BUN 7 9   CR 0.57 0.55   EMILY 8.7 8.4*   PROTTOTAL 7.0 7.5   ALBUMIN 3.5 3.9   BILITOTAL 0.6 1.0   ALKPHOS 96 110   AST 17 25   ALT 21 24     CBC   Recent Labs  Lab 07/11/17  0807 07/09/17  1340   WBC 7.1 7.5   RBC 4.79 4.46   HGB 14.0 13.0   HCT 42.7 39.0   MCV 89 87   MCH 29.2 29.1   MCHC 32.8 33.3   RDW 13.6 13.5    232     INR No lab results found in last 7 days.        Unresulted Labs Ordered in the Past 30 Days of this Admission     No orders found from 5/11/2017 to 7/11/2017.

## 2017-07-15 NOTE — PROGRESS NOTES
Pt stated that her  called her back stating the she could be transferred to other locked facilities during the court process. Nursing Supervisor spoke with her, as well as myself. Both of the places that the patient mentioned she could go ([facility] and [facility]) I called and verified that they are not locked facilities which was relayed to the patient.
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
## 2017-07-15 NOTE — PROGRESS NOTES
07/15/17 1300   Behavioral Health   Hallucinations denies / not responding to hallucinations   Thinking poor concentration   Memory confabulation   Insight denial of illness   Judgement impaired   Eye Contact drooping   Affect blunted, flat   Mood irritable   Physical Appearance/Attire disheveled;appears stated age   Hygiene well groomed   Suicidality (denies)   Self Injury (denies)   Elopement (No indication of a desire to elope. )   Activity restless;hyperactive (agitated, impulsive)   Speech tangential   Psychomotor / Gait steady;balanced     Many requests. Spoke with . Wants to be transferred to a different type of facility and we are pursuing that option with the nursing supervisor.

## 2017-07-15 NOTE — PROGRESS NOTES
07/14/17 8927   Behavioral Health   Hallucinations denies / not responding to hallucinations   Thinking poor concentration   Orientation person: oriented   Memory confabulation   Insight denial of illness;poor   Judgement impaired   Affect blunted, flat;sad   Suicidality other (see comments)  (pt denies)   Self Injury other (see comment)  (pt denies)   Activities of Daily Living   Hygiene/Grooming independent   Oral Hygiene independent   Dress independent   Room Organization independent   Behavioral Health Interventions   Depression maintain safety precautions;maintain safe secure environment;provide emotional support   Social and Therapeutic Interventions (Depression) encourage socialization with peers;encourage effective boundaries with peers;encourage participation in therapeutic groups and milieu activities

## 2017-07-16 PROCEDURE — 97150 GROUP THERAPEUTIC PROCEDURES: CPT | Mod: GO

## 2017-07-16 PROCEDURE — 25000132 ZZH RX MED GY IP 250 OP 250 PS 637: Performed by: NURSE PRACTITIONER

## 2017-07-16 PROCEDURE — 12400007 ZZH R&B MH INTERMEDIATE UMMC

## 2017-07-16 RX ADMIN — ACAMPROSATE CALCIUM 666 MG: 333 TABLET, DELAYED RELEASE ORAL at 21:21

## 2017-07-16 RX ADMIN — ACAMPROSATE CALCIUM 666 MG: 333 TABLET, DELAYED RELEASE ORAL at 07:38

## 2017-07-16 RX ADMIN — Medication 1 LOZENGE: at 04:33

## 2017-07-16 RX ADMIN — SENNOSIDES 1 TABLET: 8.6 TABLET, FILM COATED ORAL at 07:38

## 2017-07-16 RX ADMIN — Medication 7.5 MG: at 13:31

## 2017-07-16 RX ADMIN — Medication 1 LOZENGE: at 21:22

## 2017-07-16 RX ADMIN — OLANZAPINE 10 MG: 10 TABLET, FILM COATED ORAL at 13:31

## 2017-07-16 RX ADMIN — Medication 6 MG: at 21:22

## 2017-07-16 RX ADMIN — SENNOSIDES 1 TABLET: 8.6 TABLET, FILM COATED ORAL at 21:22

## 2017-07-16 RX ADMIN — HYDROXYZINE HYDROCHLORIDE 50 MG: 50 TABLET, FILM COATED ORAL at 17:46

## 2017-07-16 RX ADMIN — ACETAMINOPHEN 650 MG: 325 TABLET, FILM COATED ORAL at 07:38

## 2017-07-16 RX ADMIN — Medication 1 LOZENGE: at 17:46

## 2017-07-16 RX ADMIN — OLANZAPINE 10 MG: 10 TABLET, FILM COATED ORAL at 07:39

## 2017-07-16 RX ADMIN — MAGNESIUM HYDROXIDE 30 ML: 400 SUSPENSION ORAL at 07:38

## 2017-07-16 RX ADMIN — VENLAFAXINE HYDROCHLORIDE 150 MG: 150 TABLET, FILM COATED, EXTENDED RELEASE ORAL at 07:39

## 2017-07-16 RX ADMIN — Medication 7.5 MG: at 07:38

## 2017-07-16 RX ADMIN — Medication 7.5 MG: at 21:22

## 2017-07-16 RX ADMIN — ACAMPROSATE CALCIUM 666 MG: 333 TABLET, DELAYED RELEASE ORAL at 13:31

## 2017-07-16 RX ADMIN — HYDROXYZINE HYDROCHLORIDE 50 MG: 50 TABLET, FILM COATED ORAL at 04:33

## 2017-07-16 ASSESSMENT — ACTIVITIES OF DAILY LIVING (ADL)
DRESS: INDEPENDENT
ORAL_HYGIENE: INDEPENDENT
HYGIENE/GROOMING: INDEPENDENT
LAUNDRY: WITH SUPERVISION

## 2017-07-16 NOTE — PROGRESS NOTES
Pt requested access to use computer to pay bills. Per Kathy's most recent note, no computer use at this time. Will have primary team address this tomorrow.

## 2017-07-16 NOTE — PROGRESS NOTES
Amongst the PRn's that Angelito has been requesting over the last few days, she has been requesting zyprexa. She stated that it does not seem to be doing anything for her, but she continues to request it as she hopes that it will start to decrease her anxiety.

## 2017-07-16 NOTE — PROGRESS NOTES
"SPIRITUAL HEALTH SERVICES  SPIRITUAL ASSESSMENT Progress Note  [facility] ([facility] [address] [facility]   ON-CALL VISIT    REFERRAL SOURCE: Consult Order per Pt Request    Marcy indicated that she attends [employer] - a non-[employer] - with her four year old son.  Marcy stated that she wishes she was at [employer] today.  Marcy was anxious of her situation - noting that her son was moved into foster care and she doesn't know where he is at and worried about tomorrow's court case.  Marcy stated, \"There's nothing anyone can do for me except pray.\"  We prayed together incorporating Marcy intentions for her son, strength for tomorrow's case, and for her healing.    PLAN: Will notify unit  of visit.    Eric Castano   Intern  Pager [phone]    "

## 2017-07-16 NOTE — PLAN OF CARE
"Problem: Depressive Symptoms  Goal: Depressive Symptoms  Signs and symptoms of listed problems will be absent or manageable.     Patient will be free of suicidal thoughts and s/s of depression.  Patient will take her medications at 100% compliance.  Patient will actively participate in group activities programmed by the unit.  Patient will identify at least 3 coping skills to relieve or reduce s/s of depression.  Patient will verbalize readiness for discharge.  Patient will sleep at least 7 hours at night and takes a nap at least 20 mins during the day.  Patient will socialize or interact with staff and peers.  Upon discharge, patient will verbalize utilization of coping skills she has identified.   Outcome: Improving  Patient not compliant with two phone calls per shift phone restriction - uses phone behind tv which is not visible to staff from most areas of unit.  However, no complaints of calls today from 911 dispatch or family.  Boyfriend brought her in colored pencils with a sharpener.  She was told she could use sharpener only at the desk but walked away with it and threw it in trash in the lounge.  It was retrieved by staff and locked in her locker.  Patient received order from  on day shift for brace for right foot.  SPD informed [facility] this evening that is a special order and will not be in til Monday at the latest.  Patient was informed of this and replied \"that means I can wear the one I have at home til it comes\".  Patient has been informed that needs an order from her provider on Monday.  Per [facility], patient will not be transferred to another hospital until she has her first court appearance and court reviews her transfer request.  Manic symptoms have markedly decreased.  Patient is able to stay on topic and tolerate brief delay of gratification.  She has been pleasant and polite on her phone calls.  Speech is less pressured, thoughts more organized and she is less irritable.      "

## 2017-07-17 PROCEDURE — 97150 GROUP THERAPEUTIC PROCEDURES: CPT | Mod: GO

## 2017-07-17 PROCEDURE — 25000132 ZZH RX MED GY IP 250 OP 250 PS 637: Performed by: NURSE PRACTITIONER

## 2017-07-17 PROCEDURE — 12400007 ZZH R&B MH INTERMEDIATE UMMC

## 2017-07-17 RX ADMIN — Medication 1 LOZENGE: at 18:32

## 2017-07-17 RX ADMIN — Medication 6 MG: at 20:32

## 2017-07-17 RX ADMIN — SENNOSIDES 1 TABLET: 8.6 TABLET, FILM COATED ORAL at 20:32

## 2017-07-17 RX ADMIN — Medication 7.5 MG: at 08:20

## 2017-07-17 RX ADMIN — HYDROXYZINE HYDROCHLORIDE 50 MG: 50 TABLET, FILM COATED ORAL at 20:32

## 2017-07-17 RX ADMIN — VENLAFAXINE HYDROCHLORIDE 150 MG: 150 TABLET, FILM COATED, EXTENDED RELEASE ORAL at 08:20

## 2017-07-17 RX ADMIN — ACAMPROSATE CALCIUM 666 MG: 333 TABLET, DELAYED RELEASE ORAL at 14:10

## 2017-07-17 RX ADMIN — OLANZAPINE 10 MG: 10 TABLET, FILM COATED ORAL at 08:21

## 2017-07-17 RX ADMIN — HYDROXYZINE HYDROCHLORIDE 50 MG: 50 TABLET, FILM COATED ORAL at 12:15

## 2017-07-17 RX ADMIN — ACAMPROSATE CALCIUM 666 MG: 333 TABLET, DELAYED RELEASE ORAL at 08:20

## 2017-07-17 RX ADMIN — OLANZAPINE 10 MG: 10 TABLET, FILM COATED ORAL at 14:10

## 2017-07-17 RX ADMIN — HYDROXYZINE HYDROCHLORIDE 50 MG: 50 TABLET, FILM COATED ORAL at 03:16

## 2017-07-17 RX ADMIN — ACAMPROSATE CALCIUM 666 MG: 333 TABLET, DELAYED RELEASE ORAL at 20:32

## 2017-07-17 RX ADMIN — Medication 7.5 MG: at 16:49

## 2017-07-17 RX ADMIN — SENNOSIDES 1 TABLET: 8.6 TABLET, FILM COATED ORAL at 08:19

## 2017-07-17 RX ADMIN — Medication 1 LOZENGE: at 20:32

## 2017-07-17 ASSESSMENT — ACTIVITIES OF DAILY LIVING (ADL)
GROOMING: INDEPENDENT
ORAL_HYGIENE: INDEPENDENT

## 2017-07-17 NOTE — PLAN OF CARE
Problem: Depressive Symptoms  Goal: Social and Therapeutic (Depression)  Signs and symptoms of listed problems will be absent or manageable.   Pt attended and actively participated in 2.5 of 3.0 scheduled OT sessions today.  Pt.attended a Goal Setting session and actively participated in a discussion of guidelines for setting goals.  Pt. Identified a priority goal and action steps to accomplish that goal. Pt. Actively participated in goal directed task session. Pt was organized and creative in her efforts.  Seemed to work at a less pressured pace today.  Pt.was cooperative and pleasant throughout session.

## 2017-07-17 NOTE — PROGRESS NOTES
Met with pt who indicated that she would still like to switch providers. Pt discussed some of her frustrations with the court documents and statements made by people who don't really know her. Pt and CTC discussed this; pt is asking for her old CPS worker to take over the CPS case for this reason. CTC reflected pt's feelings; encouraged her to consider why she has been perceived in this way and how her own behaviors may contribute. Pt able to consider this feedback.

## 2017-07-17 NOTE — PLAN OF CARE
Problem: Depressive Symptoms  Goal: Depressive Symptoms  Signs and symptoms of listed problems will be absent or manageable.     Patient will be free of suicidal thoughts and s/s of depression.  Patient will take her medications at 100% compliance.  Patient will actively participate in group activities programmed by the unit.  Patient will identify at least 3 coping skills to relieve or reduce s/s of depression.  Patient will verbalize readiness for discharge.  Patient will sleep at least 7 hours at night and takes a nap at least 20 mins during the day.  Patient will socialize or interact with staff and peers.  Upon discharge, patient will verbalize utilization of coping skills she has identified.   Outcome: Improving  Hypomanic symptoms have largely abated, although patient still makes phone calls beyond two per shift, using phone behind tv which is hard to visualize from most of unit.  Most of her outgoing calls are not answered, and when they are her conversation is appropriate.  No complaints from family or 911 dispatch today.  Day shift reported court-appointed  and  visited today.  Patient's speech is less rapid and pressured; mood stable.  She requests and receives PRN Vistaril and Zyprexa, as well as menthol lozenges.  Patient has declined offers of analgesia and diclofenac topical today.  Gait appears steady; no complaints of pain.

## 2017-07-17 NOTE — PROGRESS NOTES
Call from Francis Lundberg, , at [facility], [phone]. He is the court intake  and will come to the unit to see pt around noon. CTC advised pt.

## 2017-07-17 NOTE — PROGRESS NOTES
07/16/17 2200   Behavioral Health   Hallucinations denies / not responding to hallucinations   Thinking poor concentration   Orientation person: oriented;place: oriented;date: oriented;time: oriented   Memory baseline memory   Insight denial of illness   Judgement impaired   Eye Contact at examiner   Affect blunted, flat   Mood mood is calm   ADL Assessment (WDL) WDL   Suicidality (WDL) WDL   Self Injury (WDL) WDL   Elopement (WDL) WDL   Activity withdrawn   Speech (WDL) WDL   Medication Sensitivity (WDL) WDL   Psychomotor Gait (WDL) WDL   Overt Agression (WDL) WDL   Psycho Education   Type of Intervention 1:1 intervention   Response participates, initiates socially appropriate   Hours 0.5   Treatment Detail Self Reflection   Activities of Daily Living   Hygiene/Grooming independent   Oral Hygiene independent   Dress independent   Laundry with supervision   Room Organization independent   patient spent the first half of the shift either in the lounge or on the phone, then retired early for bed. No mental health symptoms reported or witnessed.

## 2017-07-18 PROCEDURE — 99232 SBSQ HOSP IP/OBS MODERATE 35: CPT | Performed by: PSYCHIATRY & NEUROLOGY

## 2017-07-18 PROCEDURE — 25000132 ZZH RX MED GY IP 250 OP 250 PS 637: Performed by: NURSE PRACTITIONER

## 2017-07-18 PROCEDURE — 90853 GROUP PSYCHOTHERAPY: CPT

## 2017-07-18 PROCEDURE — 12400007 ZZH R&B MH INTERMEDIATE UMMC

## 2017-07-18 RX ADMIN — HYDROXYZINE HYDROCHLORIDE 50 MG: 50 TABLET, FILM COATED ORAL at 05:28

## 2017-07-18 RX ADMIN — OLANZAPINE 10 MG: 10 TABLET, FILM COATED ORAL at 08:03

## 2017-07-18 RX ADMIN — OLANZAPINE 10 MG: 10 TABLET, FILM COATED ORAL at 13:15

## 2017-07-18 RX ADMIN — OLANZAPINE 10 MG: 10 TABLET, FILM COATED ORAL at 16:39

## 2017-07-18 RX ADMIN — ACAMPROSATE CALCIUM 666 MG: 333 TABLET, DELAYED RELEASE ORAL at 21:06

## 2017-07-18 RX ADMIN — Medication 1 LOZENGE: at 18:35

## 2017-07-18 RX ADMIN — ACAMPROSATE CALCIUM 666 MG: 333 TABLET, DELAYED RELEASE ORAL at 13:15

## 2017-07-18 RX ADMIN — HYDROXYZINE HYDROCHLORIDE 50 MG: 50 TABLET, FILM COATED ORAL at 21:06

## 2017-07-18 RX ADMIN — Medication 7.5 MG: at 08:05

## 2017-07-18 RX ADMIN — MAGNESIUM HYDROXIDE 30 ML: 400 SUSPENSION ORAL at 16:40

## 2017-07-18 RX ADMIN — SENNOSIDES 1 TABLET: 8.6 TABLET, FILM COATED ORAL at 21:06

## 2017-07-18 RX ADMIN — SENNOSIDES 1 TABLET: 8.6 TABLET, FILM COATED ORAL at 08:03

## 2017-07-18 RX ADMIN — ACAMPROSATE CALCIUM 666 MG: 333 TABLET, DELAYED RELEASE ORAL at 08:03

## 2017-07-18 RX ADMIN — VENLAFAXINE HYDROCHLORIDE 150 MG: 150 TABLET, FILM COATED, EXTENDED RELEASE ORAL at 08:03

## 2017-07-18 ASSESSMENT — ACTIVITIES OF DAILY LIVING (ADL)
ORAL_HYGIENE: INDEPENDENT
ORAL_HYGIENE: INDEPENDENT
LAUNDRY: WITH SUPERVISION
GROOMING: INDEPENDENT
DRESS: INDEPENDENT
GROOMING: INDEPENDENT
DRESS: SCRUBS (BEHAVIORAL HEALTH)

## 2017-07-18 NOTE — PLAN OF CARE
"Problem: Depressive Symptoms  Goal: Depressive Symptoms  Signs and symptoms of listed problems will be absent or manageable.     Patient will be free of suicidal thoughts and s/s of depression.  Patient will take her medications at 100% compliance.  Patient will actively participate in group activities programmed by the unit.  Patient will identify at least 3 coping skills to relieve or reduce s/s of depression.  Patient will verbalize readiness for discharge.  Patient will sleep at least 7 hours at night and takes a nap at least 20 mins during the day.  Patient will socialize or interact with staff and peers.  Upon discharge, patient will verbalize utilization of coping skills she has identified.   Outcome: Catie Eaton went to court this morning. She returned around 1pm. She stated that everything in court went \"great.\" She was under the impression that this meant she could leave, or be transferred to [facility]. I explained to her the court process, that today was only the preliminary hearing and no decisions are made at that hearing, and she would have to go back for a final hearing, which she told me was Friday. She was accepting of this answer and made some more phone calls to her , etc to further clarify things. She was grateful for the education and was pleasant in her interactions.     She denies MH concerns except anxiety, has requested Zyprexa twice today which she said doesn't do anything anyway's.     She is still frustrated with the length of the process, and stated she just wants to return to work.       "
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
## 2017-07-19 PROCEDURE — 99232 SBSQ HOSP IP/OBS MODERATE 35: CPT | Performed by: PSYCHIATRY & NEUROLOGY

## 2017-07-19 PROCEDURE — 25000132 ZZH RX MED GY IP 250 OP 250 PS 637: Performed by: NURSE PRACTITIONER

## 2017-07-19 PROCEDURE — 90853 GROUP PSYCHOTHERAPY: CPT

## 2017-07-19 PROCEDURE — 12400007 ZZH R&B MH INTERMEDIATE UMMC

## 2017-07-19 PROCEDURE — H2032 ACTIVITY THERAPY, PER 15 MIN: HCPCS

## 2017-07-19 RX ADMIN — OLANZAPINE 10 MG: 10 TABLET, FILM COATED ORAL at 06:53

## 2017-07-19 RX ADMIN — Medication 1 LOZENGE: at 17:17

## 2017-07-19 RX ADMIN — OLANZAPINE 10 MG: 10 TABLET, FILM COATED ORAL at 20:15

## 2017-07-19 RX ADMIN — VENLAFAXINE HYDROCHLORIDE 150 MG: 150 TABLET, FILM COATED, EXTENDED RELEASE ORAL at 08:17

## 2017-07-19 RX ADMIN — SENNOSIDES 1 TABLET: 8.6 TABLET, FILM COATED ORAL at 20:15

## 2017-07-19 RX ADMIN — ACAMPROSATE CALCIUM 666 MG: 333 TABLET, DELAYED RELEASE ORAL at 08:17

## 2017-07-19 RX ADMIN — Medication 1 LOZENGE: at 20:18

## 2017-07-19 RX ADMIN — ACAMPROSATE CALCIUM 666 MG: 333 TABLET, DELAYED RELEASE ORAL at 20:16

## 2017-07-19 RX ADMIN — HYDROXYZINE HYDROCHLORIDE 50 MG: 50 TABLET, FILM COATED ORAL at 08:17

## 2017-07-19 RX ADMIN — OLANZAPINE 10 MG: 10 TABLET, FILM COATED ORAL at 10:56

## 2017-07-19 RX ADMIN — SENNOSIDES 1 TABLET: 8.6 TABLET, FILM COATED ORAL at 08:17

## 2017-07-19 RX ADMIN — Medication 1 LOZENGE: at 20:15

## 2017-07-19 RX ADMIN — ACAMPROSATE CALCIUM 666 MG: 333 TABLET, DELAYED RELEASE ORAL at 13:33

## 2017-07-19 RX ADMIN — OLANZAPINE 10 MG: 10 TABLET, FILM COATED ORAL at 17:17

## 2017-07-19 RX ADMIN — MAGNESIUM HYDROXIDE 30 ML: 400 SUSPENSION ORAL at 15:55

## 2017-07-19 RX ADMIN — Medication 6 MG: at 20:15

## 2017-07-19 RX ADMIN — Medication 1 LOZENGE: at 09:16

## 2017-07-19 ASSESSMENT — ACTIVITIES OF DAILY LIVING (ADL)
LAUNDRY: WITH SUPERVISION
GROOMING: SHOWER;INDEPENDENT
GROOMING: INDEPENDENT
DRESS: INDEPENDENT
LAUNDRY: WITH SUPERVISION
DRESS: STREET CLOTHES
ORAL_HYGIENE: INDEPENDENT
ORAL_HYGIENE: INDEPENDENT

## 2017-07-19 NOTE — PROGRESS NOTES
"Pt 's insight is very poor. She reports she  has no issues with chemicals & the only reason she was positive for amphetamines was because she took diet pills. Pt is saying\" she is getting her son & the courts will go after her mom because she lied to keep custody.\" Pt has poor concentration & recall & needs things repeated  From time to time.      07/19/17 1400   Behavioral Health   Hallucinations denies / not responding to hallucinations   Thinking distractable   Orientation situation, disoriented;person: oriented   Memory confabulation   Insight poor   Judgement impaired   Eye Contact at examiner   Affect full range affect   Mood other (see comments)  (slightly irritable re being here)   Physical Appearance/Attire attire appropriate to age and situation   Hygiene well groomed   Suicidality other (see comments)  (denies)   Self Injury (denies)   Activity (WDL) (no mention/none observed)   Activity restless   Speech clear;coherent   Medication Sensitivity no stated side effects   Psychomotor / Gait balanced;steady   Coping/Psychosocial   Verbalized Emotional State frustration   Plan Of Care Reviewed With patient   Psycho Education   Type of Intervention 1:1 intervention   Response participates with encouragement   Hours 0.5   Treatment Detail check in   Activities of Daily Living   Hygiene/Grooming shower;independent   Oral Hygiene independent   Dress street clothes   Laundry with supervision   Room Organization independent   Activity   Activity Level of Assistance independent   Behavioral Health Interventions   Depression maintain safety precautions;provide emotional support   Social and Therapeutic Interventions (Depression) encourage participation in therapeutic groups and milieu activities     "

## 2017-07-19 NOTE — PROGRESS NOTES
Pt approached the medication window and stated she wanted some flexaril, pt was told she did no thave this ordered. Pt requested prn Zyprexa but she had just been given it an hour ago, pt was told that she just had the zyprexa and she stated she had forgotten. Pt received prn Vistaril 50 mg's at 0817.

## 2017-07-19 NOTE — PROGRESS NOTES
Writer received court hold paperwork, copy was provided to pt as well as placed in chart. Pt asked if she could be released to [facility] and stated that yesterday the courts told her that we ([facility]) could release her if we choose to. Updated pt that now she is on court hold and we are required to keep her here until her final hearing. Pt then walked away to call her .  Writer returned voicemail message of pt's father, Maxim, at [phone] to provide update on pt's currents status. He will call back with any additional questions. ADDISON on file for father.    Received voicemail from Freya at [facility], [phone], requesting name and number of provider who can speak on behalf of pt's hospitalization at her final court date on 7/21 from 10:30-11:30 am. Writer will update pt's physician and update Freya.  Received voicemail from Mandy, pt's CPS worker at [facility], [phone] who was asking for an update. Writer called back and left voicemail requesting return call.    Pt is requesting to use computer to pay online bills.     CM continues with above.

## 2017-07-20 PROCEDURE — 99232 SBSQ HOSP IP/OBS MODERATE 35: CPT | Performed by: PSYCHIATRY & NEUROLOGY

## 2017-07-20 PROCEDURE — 12400007 ZZH R&B MH INTERMEDIATE UMMC

## 2017-07-20 PROCEDURE — 25000132 ZZH RX MED GY IP 250 OP 250 PS 637: Performed by: NURSE PRACTITIONER

## 2017-07-20 RX ADMIN — Medication 1 LOZENGE: at 08:12

## 2017-07-20 RX ADMIN — ACAMPROSATE CALCIUM 666 MG: 333 TABLET, DELAYED RELEASE ORAL at 20:21

## 2017-07-20 RX ADMIN — SENNOSIDES 1 TABLET: 8.6 TABLET, FILM COATED ORAL at 08:12

## 2017-07-20 RX ADMIN — VENLAFAXINE HYDROCHLORIDE 150 MG: 150 TABLET, FILM COATED, EXTENDED RELEASE ORAL at 08:12

## 2017-07-20 RX ADMIN — Medication 6 MG: at 20:21

## 2017-07-20 RX ADMIN — ACETAMINOPHEN 650 MG: 325 TABLET, FILM COATED ORAL at 05:45

## 2017-07-20 RX ADMIN — HYDROXYZINE HYDROCHLORIDE 50 MG: 50 TABLET, FILM COATED ORAL at 20:21

## 2017-07-20 RX ADMIN — OLANZAPINE 10 MG: 10 TABLET, FILM COATED ORAL at 08:12

## 2017-07-20 RX ADMIN — Medication 1 LOZENGE: at 16:12

## 2017-07-20 RX ADMIN — HYDROXYZINE HYDROCHLORIDE 50 MG: 50 TABLET, FILM COATED ORAL at 16:12

## 2017-07-20 RX ADMIN — ACAMPROSATE CALCIUM 666 MG: 333 TABLET, DELAYED RELEASE ORAL at 14:26

## 2017-07-20 RX ADMIN — HYDROXYZINE HYDROCHLORIDE 50 MG: 50 TABLET, FILM COATED ORAL at 11:13

## 2017-07-20 RX ADMIN — ACETAMINOPHEN 650 MG: 325 TABLET, FILM COATED ORAL at 11:13

## 2017-07-20 RX ADMIN — ACAMPROSATE CALCIUM 666 MG: 333 TABLET, DELAYED RELEASE ORAL at 08:12

## 2017-07-20 RX ADMIN — SENNOSIDES 1 TABLET: 8.6 TABLET, FILM COATED ORAL at 20:21

## 2017-07-20 ASSESSMENT — ACTIVITIES OF DAILY LIVING (ADL)
DRESS: INDEPENDENT
HYGIENE/GROOMING: HANDWASHING;INDEPENDENT
ORAL_HYGIENE: INDEPENDENT
ORAL_HYGIENE: INDEPENDENT
DRESS: STREET CLOTHES
LAUNDRY: WITH SUPERVISION
GROOMING: INDEPENDENT

## 2017-07-20 NOTE — PROGRESS NOTES
CTC met with pt who states likely to agree on a Stay. Pt does have a mis-perception that she can discharge once that is agreed upon; I have explained how it works but she assures me that her  has told her that she can discharge tomorrow. I did advise her that we will go  door to door  with treatment or  door to home  if it is outpatient but that the programming needs to be set up. I also advised that she won t be discharged tomorrow unless it  is a court order.

## 2017-07-20 NOTE — PROGRESS NOTES
Pt complained of having a heavy period and feeling shaky, light headed, aches, and fatigued. T.98.2 P. 108 R. 16 B/P 113/83 02 sat 98%. Requested and received prn Tylenol 650 mg's for aches and Atarax 50 mg's for itching/nerves.  notified, continue to monitor and document progress.
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
## 2017-07-20 NOTE — PLAN OF CARE
Problem: General Plan of Care (Inpatient Behavioral)  Goal: Individualization/Patient Specific Goal (IP Behavioral)  The patient and/or their representative will achieve their patient-specific goals related to the plan of care.    The patient-specific goals include:   Illness Management Recovery model: Objectives    Patient will identify reason(s) for hospitalization from their perspective.  Patient will identify a minimum of three goals for discharge.  Patient will identify a minimum of three triggers that may increase their symptoms.  Patient will identify a minimum of three coping skills they can do to stay well.   Patient will identify their support system to demonstrate readiness for discharge.  Outcome: Improving  Illness Management Recovery model:  Feedback.     Patient identified the following people they would like to receive feedback from if/when they see early warning signs:     Friend(s) some     Family(s): does not receive feedback      Partner/Spouse: none     Support Group Member(s):      Co-Worker(s): none

## 2017-07-20 NOTE — PROGRESS NOTES
Pt had to be awakened for 1400 medication. States she feels tired. Will continue to monitor and document.

## 2017-07-20 NOTE — PROGRESS NOTES
07/19/17 2109   Behavioral Health   Hallucinations denies / not responding to hallucinations   Thinking intact   Orientation time: oriented;date: oriented;place: oriented;person: oriented   Memory baseline memory   Insight insight appropriate to situation   Judgement intact   Eye Contact at examiner   Affect blunted, flat   Mood mood is calm   Physical Appearance/Attire neat   Hygiene well groomed   Suicidality other (see comments)  (Pt denies.)   Self Injury other (see comment)  (Pt denies.)   Activity withdrawn   Speech coherent;clear   Medication Sensitivity no stated side effects;no observed side effects   Psychomotor / Gait balanced;steady   Psycho Education   Type of Intervention 1:1 intervention   Response participates, initiates socially appropriate   Hours 0.5   Treatment Detail (1:! check in.)   Activities of Daily Living   Hygiene/Grooming independent   Oral Hygiene independent   Dress independent   Laundry with supervision   Room Organization independent   Activity   Activity Level of Assistance independent   Behavioral Health Interventions   Depression maintain safety precautions   Social and Therapeutic Interventions (Depression) encourage socialization with peers     Pt visible in milieu all shift watching TV and making phone calls. Her goal was to relax and not stress. Rated depression at a 1 and anxiety at a 4, report wanting to go home. Denies SI/SIB, plans to discharge Friday.

## 2017-07-20 NOTE — PLAN OF CARE
Problem: Depressive Symptoms  Goal: Depressive Symptoms  Signs and symptoms of listed problems will be absent or manageable.     Patient will be free of suicidal thoughts and s/s of depression.  Patient will take her medications at 100% compliance.  Patient will actively participate in group activities programmed by the unit.  Patient will identify at least 3 coping skills to relieve or reduce s/s of depression.  Patient will verbalize readiness for discharge.  Patient will sleep at least 7 hours at night and takes a nap at least 20 mins during the day.  Patient will socialize or interact with staff and peers.  Upon discharge, patient will verbalize utilization of coping skills she has identified.   Outcome: Improving  Pt denies suicidal ideation or self injury. Pt states that she feels depressed due to the court process. Pt rates her depression at a 5/10. Pt rates anxiety at a 6/10. Pt attending group. Pt is able to identify coping skills. Pt states she is ready for discharge. Pt sleeping 6 hopurs last night. Pt is social with staff and peers.

## 2017-07-21 PROCEDURE — 12400007 ZZH R&B MH INTERMEDIATE UMMC

## 2017-07-21 PROCEDURE — 25000132 ZZH RX MED GY IP 250 OP 250 PS 637: Performed by: NURSE PRACTITIONER

## 2017-07-21 PROCEDURE — 90853 GROUP PSYCHOTHERAPY: CPT

## 2017-07-21 RX ADMIN — ACAMPROSATE CALCIUM 666 MG: 333 TABLET, DELAYED RELEASE ORAL at 19:59

## 2017-07-21 RX ADMIN — HYDROXYZINE HYDROCHLORIDE 50 MG: 50 TABLET, FILM COATED ORAL at 20:00

## 2017-07-21 RX ADMIN — Medication 1 LOZENGE: at 14:31

## 2017-07-21 RX ADMIN — SENNOSIDES 1 TABLET: 8.6 TABLET, FILM COATED ORAL at 08:18

## 2017-07-21 RX ADMIN — OLANZAPINE 10 MG: 10 TABLET, FILM COATED ORAL at 16:21

## 2017-07-21 RX ADMIN — SENNOSIDES 1 TABLET: 8.6 TABLET, FILM COATED ORAL at 20:00

## 2017-07-21 RX ADMIN — HYDROXYZINE HYDROCHLORIDE 50 MG: 50 TABLET, FILM COATED ORAL at 12:55

## 2017-07-21 RX ADMIN — VENLAFAXINE HYDROCHLORIDE 150 MG: 150 TABLET, FILM COATED, EXTENDED RELEASE ORAL at 08:18

## 2017-07-21 RX ADMIN — Medication 1 LOZENGE: at 12:55

## 2017-07-21 RX ADMIN — HYDROXYZINE HYDROCHLORIDE 50 MG: 50 TABLET, FILM COATED ORAL at 04:53

## 2017-07-21 RX ADMIN — Medication 6 MG: at 20:00

## 2017-07-21 RX ADMIN — ACAMPROSATE CALCIUM 666 MG: 333 TABLET, DELAYED RELEASE ORAL at 14:31

## 2017-07-21 RX ADMIN — ACETAMINOPHEN 650 MG: 325 TABLET, FILM COATED ORAL at 04:53

## 2017-07-21 RX ADMIN — ACAMPROSATE CALCIUM 666 MG: 333 TABLET, DELAYED RELEASE ORAL at 08:18

## 2017-07-21 ASSESSMENT — ACTIVITIES OF DAILY LIVING (ADL)
GROOMING: HANDWASHING;SHOWER;INDEPENDENT
ORAL_HYGIENE: INDEPENDENT
LAUNDRY: WITH SUPERVISION
DRESS: INDEPENDENT
DRESS: STREET CLOTHES;INDEPENDENT
ORAL_HYGIENE: INDEPENDENT
GROOMING: INDEPENDENT

## 2017-07-21 NOTE — PROGRESS NOTES
Call from Francis  at [facility] court, [phone]. Pt has agreed to a stay of commitment. He clarified that we are still waiting for the CD assessment determination. States that they made it very clear to pt that she will stay inpatient until the disposition is decided. Pt told court officials that the hospital staff have told her that she can be released. Discussed pt's varying reports to different agencies. CTC advised that hospital staff are not recommending discharge until disposition is determined and set up.

## 2017-07-21 NOTE — PLAN OF CARE
Problem: Depressive Symptoms  Goal: Depressive Symptoms  Signs and symptoms of listed problems will be absent or manageable.     Patient will be free of suicidal thoughts and s/s of depression.  Patient will take her medications at 100% compliance.  Patient will actively participate in group activities programmed by the unit.  Patient will identify at least 3 coping skills to relieve or reduce s/s of depression.  Patient will verbalize readiness for discharge.  Patient will sleep at least 7 hours at night and takes a nap at least 20 mins during the day.  Patient will socialize or interact with staff and peers.  Upon discharge, patient will verbalize utilization of coping skills she has identified.   Outcome: Therapy, progress towards functional goals is fair  48 hour nursing assessment.  Pt evaluation continues.  Assessed mood, anxiety, thoughts and behavior.  Is progressing towards goals.  Encourage participation in groups and developing health coping skills.  Will continue to assess.  Pt denies auditory or visual hallucinations.  Refer to daily team meeting notes for individualized plan of care.     Pt had a good shift.  She denies all depression and states she is feeling better than when she originally arrived to the unit.  Pt seemed to be tracking the conversation, able to ask appropriate questions and respond accordingly.  States she is anxious but has been her whole life.  No report of any uncomfortable side-effects.  She is nervous about her court hearing on 7/21/17.  She asked for zyprexa x2 this shift but was given hydroxyzine which appeared to have been effective.  Pt didn't demonstrate any agitation or aggression this shift.

## 2017-07-21 NOTE — PROGRESS NOTES
Pt awake entire shift.  Pt went to court.  Pt attended groups upon return from court.  Pt cooperative and pleasant.       07/21/17 1407   Behavioral Health   Hallucinations denies / not responding to hallucinations   Thinking poor concentration   Orientation person: oriented;place: oriented;date: oriented;time: oriented   Memory baseline memory   Insight poor   Judgement (limited)   Eye Contact at examiner   Affect full range affect   Mood anxious   Physical Appearance/Attire attire appropriate to age and situation   Hygiene well groomed   Suicidality other (see comments)  (denies)   Activity other (see comment)  (went to court)   Speech clear;coherent   Psychomotor / Gait balanced;steady   Sleep/Rest/Relaxation   Day/Evening Time Hours up all shift   Coping/Psychosocial   Verbalized Emotional State anxiety;frustration;powerlessness   Plan Of Care Reviewed With patient   Psycho Education   Type of Intervention 1:1 intervention   Response participates, initiates socially appropriate   Activities of Daily Living   Hygiene/Grooming handwashing;shower;independent   Oral Hygiene independent   Dress street clothes;independent   Activity   Activity Level of Assistance independent   Behavioral Health Interventions   Depression provide emotional support;assist with developing and utilizing healthy coping strategies;assess patient response to medication   Social and Therapeutic Interventions (Depression) encourage effective boundaries with peers;encourage participation in therapeutic groups and milieu activities
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
## 2017-07-21 NOTE — PLAN OF CARE
Problem: Depressive Symptoms  Goal: Depressive Symptoms  Signs and symptoms of listed problems will be absent or manageable.     Patient will be free of suicidal thoughts and s/s of depression.  Patient will take her medications at 100% compliance.  Patient will actively participate in group activities programmed by the unit.  Patient will identify at least 3 coping skills to relieve or reduce s/s of depression.  Patient will verbalize readiness for discharge.  Patient will sleep at least 7 hours at night and takes a nap at least 20 mins during the day.  Patient will socialize or interact with staff and peers.  Upon discharge, patient will verbalize utilization of coping skills she has identified.      Attended afternoon group focused on the topic of the Process of Recovery. She initiated comments, added ideas to peer's answers in support and with additional information. She stated understanding if she is not living up to her family's expectations, it needs to be her acceptance of how she feels, and not how they feel. She stated being pleased she has come to this conclusion. She was pleasant with others, appeared attentive, involved.

## 2017-07-22 PROCEDURE — 25000132 ZZH RX MED GY IP 250 OP 250 PS 637: Performed by: NURSE PRACTITIONER

## 2017-07-22 PROCEDURE — 12400007 ZZH R&B MH INTERMEDIATE UMMC

## 2017-07-22 RX ADMIN — SENNOSIDES 1 TABLET: 8.6 TABLET, FILM COATED ORAL at 19:12

## 2017-07-22 RX ADMIN — OLANZAPINE 10 MG: 10 TABLET, FILM COATED ORAL at 20:58

## 2017-07-22 RX ADMIN — Medication 1 LOZENGE: at 05:51

## 2017-07-22 RX ADMIN — VENLAFAXINE HYDROCHLORIDE 150 MG: 150 TABLET, FILM COATED, EXTENDED RELEASE ORAL at 08:30

## 2017-07-22 RX ADMIN — ACAMPROSATE CALCIUM 666 MG: 333 TABLET, DELAYED RELEASE ORAL at 19:12

## 2017-07-22 RX ADMIN — HYDROXYZINE HYDROCHLORIDE 50 MG: 50 TABLET, FILM COATED ORAL at 19:12

## 2017-07-22 RX ADMIN — HYDROXYZINE HYDROCHLORIDE 50 MG: 50 TABLET, FILM COATED ORAL at 08:30

## 2017-07-22 RX ADMIN — SENNOSIDES 1 TABLET: 8.6 TABLET, FILM COATED ORAL at 08:30

## 2017-07-22 RX ADMIN — ACETAMINOPHEN 650 MG: 325 TABLET, FILM COATED ORAL at 05:51

## 2017-07-22 RX ADMIN — ACAMPROSATE CALCIUM 666 MG: 333 TABLET, DELAYED RELEASE ORAL at 08:30

## 2017-07-22 RX ADMIN — Medication 6 MG: at 20:58

## 2017-07-22 RX ADMIN — Medication 1 LOZENGE: at 13:14

## 2017-07-22 RX ADMIN — ACAMPROSATE CALCIUM 666 MG: 333 TABLET, DELAYED RELEASE ORAL at 13:13

## 2017-07-22 RX ADMIN — ACETAMINOPHEN 650 MG: 325 TABLET, FILM COATED ORAL at 13:14

## 2017-07-22 ASSESSMENT — ACTIVITIES OF DAILY LIVING (ADL)
GROOMING: INDEPENDENT
ORAL_HYGIENE: INDEPENDENT
GROOMING: SHOWER;INDEPENDENT
ORAL_HYGIENE: INDEPENDENT
DRESS: INDEPENDENT
LAUNDRY: WITH SUPERVISION
DRESS: INDEPENDENT
LAUNDRY: WITH SUPERVISION

## 2017-07-22 NOTE — PROGRESS NOTES
07/21/17 2131   Behavioral Health   Hallucinations denies / not responding to hallucinations   Thinking poor concentration   Orientation time: oriented;place: oriented;date: oriented;person: oriented   Memory baseline memory   Insight poor   Judgement impaired   Affect full range affect   Mood mood is calm   Physical Appearance/Attire neat   Hygiene well groomed   Self Injury other (see comment)  (Pt denies.)   Activity other (see comment);withdrawn  (Pt sleeping/napping, visible for part of this shift.)   Speech clear;coherent   Medication Sensitivity no observed side effects;no stated side effects   Psychomotor / Gait balanced;steady   Psycho Education   Type of Intervention 1:1 intervention   Response participates, initiates socially appropriate   Hours 0.5   Treatment Detail 1:1 check in.  (cj)   Activities of Daily Living   Hygiene/Grooming independent   Oral Hygiene independent   Dress independent   Laundry with supervision   Room Organization independent   Activity   Activity Level of Assistance independent     Pt withdrawn at the beginning of the shift sleeping/napping. Pt was later visible in milieu watching TV with peers. Her goal was to attend court today, states it went well.  Pt reports being agitated due to not meeting with her doctor to talk about discharge and not knowing what's next.  Rated agitation at a 3 and anxiety at a 4, denies SI/SIB. Pt reports wanting to discharge as soon as possible.

## 2017-07-23 PROCEDURE — 12400007 ZZH R&B MH INTERMEDIATE UMMC

## 2017-07-23 PROCEDURE — 90853 GROUP PSYCHOTHERAPY: CPT

## 2017-07-23 PROCEDURE — 25000132 ZZH RX MED GY IP 250 OP 250 PS 637: Performed by: NURSE PRACTITIONER

## 2017-07-23 RX ADMIN — Medication 6 MG: at 20:18

## 2017-07-23 RX ADMIN — ACAMPROSATE CALCIUM 666 MG: 333 TABLET, DELAYED RELEASE ORAL at 08:26

## 2017-07-23 RX ADMIN — SENNOSIDES 1 TABLET: 8.6 TABLET, FILM COATED ORAL at 20:18

## 2017-07-23 RX ADMIN — VENLAFAXINE HYDROCHLORIDE 150 MG: 150 TABLET, FILM COATED, EXTENDED RELEASE ORAL at 08:25

## 2017-07-23 RX ADMIN — SENNOSIDES 1 TABLET: 8.6 TABLET, FILM COATED ORAL at 08:26

## 2017-07-23 RX ADMIN — HYDROXYZINE HYDROCHLORIDE 50 MG: 50 TABLET, FILM COATED ORAL at 08:25

## 2017-07-23 RX ADMIN — HYDROXYZINE HYDROCHLORIDE 50 MG: 50 TABLET, FILM COATED ORAL at 13:43

## 2017-07-23 RX ADMIN — ACAMPROSATE CALCIUM 666 MG: 333 TABLET, DELAYED RELEASE ORAL at 20:18

## 2017-07-23 RX ADMIN — HYDROXYZINE HYDROCHLORIDE 50 MG: 50 TABLET, FILM COATED ORAL at 20:18

## 2017-07-23 RX ADMIN — ACAMPROSATE CALCIUM 666 MG: 333 TABLET, DELAYED RELEASE ORAL at 13:43

## 2017-07-23 ASSESSMENT — ACTIVITIES OF DAILY LIVING (ADL)
GROOMING: INDEPENDENT
DRESS: STREET CLOTHES
ORAL_HYGIENE: INDEPENDENT

## 2017-07-23 NOTE — PLAN OF CARE
Problem: Depressive Symptoms  Goal: Depressive Symptoms  Signs and symptoms of listed problems will be absent or manageable.     Patient will be free of suicidal thoughts and s/s of depression.  Patient will take her medications at 100% compliance.  Patient will actively participate in group activities programmed by the unit.  Patient will identify at least 3 coping skills to relieve or reduce s/s of depression.  Patient will verbalize readiness for discharge.  Patient will sleep at least 7 hours at night and takes a nap at least 20 mins during the day.  Patient will socialize or interact with staff and peers.  Upon discharge, patient will verbalize utilization of coping skills she has identified.   Outcome: No Change  48 hour nursing assessment.  Pt evaluation continues.  Assessed mood, anxiety, thoughts and behavior. Patient denies SI,SIB.  Patient is calm with full range affect.  Patient has poor insight into her situation.  She had a final hearing for commitment Friday and she thinks once she speaks with Dr. Salcedo on Monday she will get discharged shortly after.  Is progressing towards goals.  Encourage participation in groups and developing health coping skills.  Will continue to assess.  Pt denies auditory or visual hallucinations.  Refer to daily team meeting notes for individualized plan of care.     On this shift, patient was out in milieu from dinner time till bedtime playing games with peers, talking on phone, watching movie.  Patient hoping to see fireworks from the [employer] tonight from OT room.  She was going to ask staff if she could go in there and watch.  Will continue to monitor.

## 2017-07-23 NOTE — PROGRESS NOTES
Patient reported she was feeling less irritable,more focused with better concentration.  She said she realises she needs to take her medications to stay well. She was social and appropriate in milieu,spent her time drawing,colouring,attended group and was calm

## 2017-07-24 PROCEDURE — 99232 SBSQ HOSP IP/OBS MODERATE 35: CPT | Performed by: PSYCHIATRY & NEUROLOGY

## 2017-07-24 PROCEDURE — 12400007 ZZH R&B MH INTERMEDIATE UMMC

## 2017-07-24 PROCEDURE — 25000132 ZZH RX MED GY IP 250 OP 250 PS 637: Performed by: PSYCHIATRY & NEUROLOGY

## 2017-07-24 PROCEDURE — 90853 GROUP PSYCHOTHERAPY: CPT

## 2017-07-24 PROCEDURE — 25000132 ZZH RX MED GY IP 250 OP 250 PS 637: Performed by: NURSE PRACTITIONER

## 2017-07-24 RX ADMIN — SENNOSIDES 1 TABLET: 8.6 TABLET, FILM COATED ORAL at 19:45

## 2017-07-24 RX ADMIN — VENLAFAXINE HYDROCHLORIDE 150 MG: 150 TABLET, FILM COATED, EXTENDED RELEASE ORAL at 08:28

## 2017-07-24 RX ADMIN — HYDROXYZINE HYDROCHLORIDE 50 MG: 50 TABLET, FILM COATED ORAL at 17:07

## 2017-07-24 RX ADMIN — ACAMPROSATE CALCIUM 666 MG: 333 TABLET, DELAYED RELEASE ORAL at 08:28

## 2017-07-24 RX ADMIN — HYDROXYZINE HYDROCHLORIDE 50 MG: 50 TABLET, FILM COATED ORAL at 12:37

## 2017-07-24 RX ADMIN — BENZOCAINE, MENTHOL 1 LOZENGE: 15; 3.6 LOZENGE ORAL at 11:00

## 2017-07-24 RX ADMIN — ACETAMINOPHEN 650 MG: 325 TABLET, FILM COATED ORAL at 13:23

## 2017-07-24 RX ADMIN — BENZOCAINE, MENTHOL 1 LOZENGE: 15; 3.6 LOZENGE ORAL at 19:45

## 2017-07-24 RX ADMIN — ACETAMINOPHEN 650 MG: 325 TABLET, FILM COATED ORAL at 19:45

## 2017-07-24 RX ADMIN — SENNOSIDES 1 TABLET: 8.6 TABLET, FILM COATED ORAL at 08:28

## 2017-07-24 RX ADMIN — ACAMPROSATE CALCIUM 666 MG: 333 TABLET, DELAYED RELEASE ORAL at 13:23

## 2017-07-24 RX ADMIN — Medication 6 MG: at 19:44

## 2017-07-24 RX ADMIN — HYDROXYZINE HYDROCHLORIDE 50 MG: 50 TABLET, FILM COATED ORAL at 06:34

## 2017-07-24 RX ADMIN — ACAMPROSATE CALCIUM 666 MG: 333 TABLET, DELAYED RELEASE ORAL at 19:45

## 2017-07-24 ASSESSMENT — ACTIVITIES OF DAILY LIVING (ADL)
ORAL_HYGIENE: INDEPENDENT
DRESS: STREET CLOTHES
DRESS: INDEPENDENT
ORAL_HYGIENE: INDEPENDENT
LAUNDRY: UNABLE TO COMPLETE
GROOMING: INDEPENDENT
GROOMING: INDEPENDENT

## 2017-07-24 NOTE — PROGRESS NOTES
"Writer spoke with Arlene from [facility] who reports she will be making a referral for pt to attend outpatient at [facility] and pt has looked into sober housing though [facility]. Pt has CPS as well as stay of commitment so Arlene feels this level of care would be appropriate for pt. She will fax assessment to [facility]. Pt was updated, obtained ADDISON from [facility] to confirm treatment and ADDISON from [facility] to confirm sober housing and help with arranging details. Writer placed call to Francis pt's mental health  to update. Left voicemail message requesting return call.     Update: Writer spoke to Francis from [facility] and updated him on plan for outpatient at [facility] with sober living. FELIX Blanco stated he was hopeful for inpatient and is unsure if this will be best option for pt but is willing to go along with Rule 25 evaluators recommendation.    Pt reports she is able to admit to [facility] on Thursday 7/27.  Pt reports immediate openings at [facility] and is hopeful for d/c this week. Writer spoke to Pat at [facility] sober home and updated her on pt's current status. Inquired if pt has 30 days sobriety, pt tested positive for amphetamines on 7/10. Informed her that pt does not. Pat reported that pt stated she had 30 days of sobriety. Records indicate that pt tested positive for amphetamines in the ED, pt reports \"I took diet pills\". They will be unwilling to accept her at this time. Pt stated she will request d/c with a plan to go back to her apartment in [place] and then do outpatient treatment at [facility]. I updated Arlene from [facility] who will be faxing the assessment to [place] inpatient department.   "

## 2017-07-24 NOTE — PROGRESS NOTES
Pt's father L/M to call him re Pt's progress.  Check her chart .  She has rescinded the ADDISON for him.  Did not return call.

## 2017-07-24 NOTE — PROGRESS NOTES
48 Hour Assessment:  Mallory was up ad rudy, was out of her room mostly and active in the milieu. Pt attended groups. Mallory denies feeling depressed, denies having thoughts of harming herself or others. Pt did request PRN Hydroxyzine x2 since 6:30am today. Pt also requested PRN Zyprexa but writer discussed indications for use and Pt changed her mind. Pt affect was full range, she was med compliant, pleasant, and cooperative. Pt reports she is hopeful to discharge soon.

## 2017-07-24 NOTE — PROGRESS NOTES
Mallory participated in a reflection group this a.m. on gifts she has received and given throughout her life. Completed a journaling exercise and shared her reflections with the group. Initially stated that she was not feeling well and may not be able to stay for the session, however quickly became engrossed in the activity and shared substantively about her appreciation for family members and her higher power.

## 2017-07-24 NOTE — PLAN OF CARE
Problem: Depressive Symptoms  Goal: Depressive Symptoms  Signs and symptoms of listed problems will be absent or manageable.     Patient will be free of suicidal thoughts and s/s of depression.  Patient will take her medications at 100% compliance.  Patient will actively participate in group activities programmed by the unit.  Patient will identify at least 3 coping skills to relieve or reduce s/s of depression.  Patient will verbalize readiness for discharge.  Patient will sleep at least 7 hours at night and takes a nap at least 20 mins during the day.  Patient will socialize or interact with staff and peers.  Upon discharge, patient will verbalize utilization of coping skills she has identified.   Outcome: Improving  48 Hour Assessment:  Mallory was up ad rudy, was out of her room mostly and active in the milieu. Pt attended groups. Mallory denies feeling depressed, denies having thoughts of harming herself or others. Pt did request PRN Hydroxyzine x2 since 6:30am today. Pt also requested PRN Zyprexa but writer discussed indications for use and Pt changed her mind. Pt affect was full range, she was med compliant, pleasant, and cooperative. Pt reports she is hopeful to discharge soon.
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
## 2017-07-24 NOTE — PROGRESS NOTES
Pt spent most of this shift isolative and withdrawn in room. On approach behavior was appropriate, present with tense affect, pt was visible out in milieu for meal and that went well. Denies SI, SIB, HI and cooperates.

## 2017-07-25 PROCEDURE — 99232 SBSQ HOSP IP/OBS MODERATE 35: CPT | Performed by: PSYCHIATRY & NEUROLOGY

## 2017-07-25 PROCEDURE — 25000132 ZZH RX MED GY IP 250 OP 250 PS 637: Performed by: NURSE PRACTITIONER

## 2017-07-25 PROCEDURE — 12400007 ZZH R&B MH INTERMEDIATE UMMC

## 2017-07-25 RX ORDER — VENLAFAXINE HYDROCHLORIDE 150 MG/1
150 TABLET, EXTENDED RELEASE ORAL
Qty: 30 EACH | Refills: 0 | Status: SHIPPED | OUTPATIENT
Start: 2017-07-25 | End: 2020-07-06

## 2017-07-25 RX ORDER — LANOLIN ALCOHOL/MO/W.PET/CERES
6 CREAM (GRAM) TOPICAL
Qty: 60 TABLET | Refills: 0 | Status: SHIPPED | OUTPATIENT
Start: 2017-07-25 | End: 2020-07-06

## 2017-07-25 RX ORDER — HYDROXYZINE HYDROCHLORIDE 50 MG/1
50 TABLET, FILM COATED ORAL 3 TIMES DAILY PRN
Qty: 90 TABLET | Refills: 0 | Status: SHIPPED | OUTPATIENT
Start: 2017-07-25 | End: 2020-07-06

## 2017-07-25 RX ORDER — ACAMPROSATE CALCIUM 333 MG/1
666 TABLET, DELAYED RELEASE ORAL 3 TIMES DAILY
Qty: 180 TABLET | Refills: 0 | Status: SHIPPED | OUTPATIENT
Start: 2017-07-25 | End: 2020-07-06

## 2017-07-25 RX ADMIN — ACAMPROSATE CALCIUM 666 MG: 333 TABLET, DELAYED RELEASE ORAL at 20:31

## 2017-07-25 RX ADMIN — OLANZAPINE 10 MG: 10 TABLET, FILM COATED ORAL at 14:55

## 2017-07-25 RX ADMIN — HYDROXYZINE HYDROCHLORIDE 50 MG: 50 TABLET, FILM COATED ORAL at 20:31

## 2017-07-25 RX ADMIN — ACETAMINOPHEN 650 MG: 325 TABLET, FILM COATED ORAL at 14:55

## 2017-07-25 RX ADMIN — ACAMPROSATE CALCIUM 666 MG: 333 TABLET, DELAYED RELEASE ORAL at 08:25

## 2017-07-25 RX ADMIN — VENLAFAXINE HYDROCHLORIDE 150 MG: 150 TABLET, FILM COATED, EXTENDED RELEASE ORAL at 08:25

## 2017-07-25 RX ADMIN — SENNOSIDES 1 TABLET: 8.6 TABLET, FILM COATED ORAL at 20:31

## 2017-07-25 RX ADMIN — SENNOSIDES 1 TABLET: 8.6 TABLET, FILM COATED ORAL at 08:25

## 2017-07-25 RX ADMIN — HYDROXYZINE HYDROCHLORIDE 50 MG: 50 TABLET, FILM COATED ORAL at 12:28

## 2017-07-25 RX ADMIN — ACAMPROSATE CALCIUM 666 MG: 333 TABLET, DELAYED RELEASE ORAL at 14:04

## 2017-07-25 RX ADMIN — Medication 6 MG: at 20:32

## 2017-07-25 RX ADMIN — OLANZAPINE 10 MG: 10 TABLET, FILM COATED ORAL at 10:50

## 2017-07-25 RX ADMIN — HYDROXYZINE HYDROCHLORIDE 50 MG: 50 TABLET, FILM COATED ORAL at 02:43

## 2017-07-25 RX ADMIN — HYDROXYZINE HYDROCHLORIDE 50 MG: 50 TABLET, FILM COATED ORAL at 08:25

## 2017-07-25 ASSESSMENT — ACTIVITIES OF DAILY LIVING (ADL)
ORAL_HYGIENE: INDEPENDENT
GROOMING: INDEPENDENT
ORAL_HYGIENE: INDEPENDENT
GROOMING: INDEPENDENT
DRESS: INDEPENDENT
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
## 2017-07-25 NOTE — PROGRESS NOTES
"Pt had a difficult shift. Pt was tense, irritable, and agitated about discharge disposition.  Pt emphatically stated several times \"I'm not staying, I'm leaving today, you all don't have a right to keep me here.\" Pt demanded to speak with supervisor and patient relations.  This writer notified supervisor and provided pt with the telephone number to pt relations.  Pt continues to attempt to staff split regarding discharge disposition and is resistant to redirection.  No SI/SIB stated or observed. Pt requested and received PRN hydroxyzine x2 and PRN Zyprexa x 2. Pt continues to perseverate on d/c disposition.  "

## 2017-07-25 NOTE — PROGRESS NOTES
07/24/17 2100   Behavioral Health   Hallucinations denies / not responding to hallucinations   Thinking poor concentration   Orientation time: oriented;date: oriented;place: oriented;person: oriented   Memory baseline memory   Insight insight appropriate to situation   Judgement intact   Eye Contact at examiner   Affect full range affect   Mood mood is calm   Physical Appearance/Attire attire appropriate to age and situation   Hygiene well groomed   Suicidality other (see comments)  (Pt denies.)   Self Injury other (see comment)  (Pt denies.)   Activity other (see comment)  (Pt visible in milieu , social with peers.)   Speech clear;coherent   Medication Sensitivity no observed side effects;no stated side effects   Psychomotor / Gait balanced;steady   Psycho Education   Type of Intervention 1:1 intervention   Response participates, initiates socially appropriate   Hours 0.5   Treatment Detail (1:1 check in.)   Activities of Daily Living   Hygiene/Grooming independent   Oral Hygiene independent   Dress independent   Laundry unable to complete   Room Organization independent   Activity   Activity Level of Assistance independent   Behavioral Health Interventions   Depression maintain safety precautions   Social and Therapeutic Interventions (Depression) encourage socialization with peers     Pt appears to be having an awesome day. Smiling when approached for a 1:1 check in. Pt reports being very excited about tomorrow's discharge, states she is excited to see her 3 year old son. PT plans to discharge to a sober house in [place] and will be attending day treatment. Rated her  anxiety at a 3 and denies all other mental health symptoms.

## 2017-07-25 NOTE — PROGRESS NOTES
Call from [facility]. Have received pt's chemical dependency assessment; wants to know if the paperwork has been submitted. [phone]. [facility] sent this question to the CD , Veronica.

## 2017-07-25 NOTE — PROGRESS NOTES
Pt reports that she is ready for discharge today and that Dr. Salcedo told her she could discharge. [facility] explained that she cannot be discharged until there is a placement for her and the recommendation is for residential level of care. Pt is interested in [facility] which is supported by the CD  and the staff on this unit.     Pt did phone interview with Latasha Parmar at [facility] but was denied due to not being honest with them regarding her sobriety.   The CD  has also sent information to [facility].     Phone conversation with Veronica; she did refer to [facility] with one of their sober houses. She has also referred to [facility] and will refer to the [facility] in [place].     [facility] spoke with pt who states got accepted to [facility] outpatient to start on Thursday; plans to go to a sober house. [facility] called [facility], [phone], left a message. Called again, spoke with Alok. He states that pt told him that she was done here(at this facility) and will be moving in with Noreen. [facility] advised him that [facility] denied her. She was going to the [facility], 4 hour per day, 5 days a week program. She can't go unless she has sober housing. Could go to other housing options through them. Does not have to be a sober house with them but does have to be somewhere sober and safe. [facility] requested that he keep the intake on Thursday at 8 am in case that pt's housing get confirmed.     Pt very upset, insists that she is discharging today and has an appointment today to see her son at 2 pm. [facility] did advise that it is unlikely that the CPS worker would schedule a visitation without checking in with the hospital to assure discharge and treatment plan. Additionally, since there is a restraining order on pt it is unlikely that any visitation was scheduled.     [facility] called pt's CPS worker, Mandy Cassidy, [phone] (cell). Unable to leave message as voice mail was full. Voice mail for her office number also full. [facility] called [facility], [phone]. Supervisor is Kathy Lou, [phone]. [facility] called, left message.     Pt states that she was accepted into [facility], [phone], after interviewing with Natacha.  [facility] called, left message for Bernabe. Also e-mailed. Received the following e-mail back with an attachment for the application:    Anderson Kwong,  I believe Mallory spoke with my wife Natacha and she forwarded her the application for the sober home and we have not received it back as of yet.     She can either scan and email the application or fax it to us at [phone] and we will look over her application the same day then either schedule a tour if she likes.    We must also confirm she is eligible for [facility] lod[facility] so her paperwork and everything wuld need to be in order over there.    I'll reach out to Mallory to confirm these details so we can move forward.      Thanks.    Natacha Ashton  [phone]    [facility] gave pt a copy of the application. Later asked pt for it, advised pt that [facility] could fax/scan it back to the [place]. Pt was agitated, stated she was already accepted and would give them a copy when she got there for the tour. [facility] advised that, per the e-mail, they need the application before they officially accept her. Also advised that pt cannot be discharged until the lodging component of treatment is set up. Pt upset by this, insisted that she is voluntary and will discharge today. [facility] explained again about the stay of commitment and that a plan must be set up before pt is discharged; that this will include a housing component. Pt did continually interrupt [facility]'s explanation of this; states will talk to the doctor and get discharged.     Second e-mail from [facility]:  Anderson Kwong,  We will look over the application and call [facility] to confirm she is accepted there also as that must be set up also.    When is the earliest she can be released after confirmation?    Also after we confirm she will need to  cover the $300 sober deposit but we can discuss this after .    Thanks    CTC advised pt is ready to go once there is confirmation of acceptance. Also advised pt has 10 intake on Thursday. [facility] did advise pt of above, advised of the $300 deposit which pt states she has.     Pt remained agitated throughout the day, insisting at one time on signing a 12 hour intent; continued to insist she was voluntary and that Dr. Salcedo will discharge her.

## 2017-07-26 VITALS
TEMPERATURE: 97.4 F | SYSTOLIC BLOOD PRESSURE: 129 MMHG | RESPIRATION RATE: 17 BRPM | HEIGHT: 66 IN | DIASTOLIC BLOOD PRESSURE: 76 MMHG | WEIGHT: 171.2 LBS | OXYGEN SATURATION: 97 % | HEART RATE: 88 BPM | BODY MASS INDEX: 27.51 KG/M2

## 2017-07-26 PROCEDURE — 25000132 ZZH RX MED GY IP 250 OP 250 PS 637: Performed by: NURSE PRACTITIONER

## 2017-07-26 PROCEDURE — 99239 HOSP IP/OBS DSCHRG MGMT >30: CPT | Performed by: PSYCHIATRY & NEUROLOGY

## 2017-07-26 RX ADMIN — ACAMPROSATE CALCIUM 666 MG: 333 TABLET, DELAYED RELEASE ORAL at 08:23

## 2017-07-26 RX ADMIN — OLANZAPINE 10 MG: 10 TABLET, FILM COATED ORAL at 11:06

## 2017-07-26 RX ADMIN — VENLAFAXINE HYDROCHLORIDE 150 MG: 150 TABLET, FILM COATED, EXTENDED RELEASE ORAL at 08:23

## 2017-07-26 RX ADMIN — HYDROXYZINE HYDROCHLORIDE 50 MG: 50 TABLET, FILM COATED ORAL at 09:57

## 2017-07-26 RX ADMIN — OLANZAPINE 10 MG: 10 TABLET, FILM COATED ORAL at 08:23

## 2017-07-26 RX ADMIN — HYDROXYZINE HYDROCHLORIDE 50 MG: 50 TABLET, FILM COATED ORAL at 05:14

## 2017-07-26 RX ADMIN — SENNOSIDES 1 TABLET: 8.6 TABLET, FILM COATED ORAL at 08:23

## 2017-07-26 ASSESSMENT — ACTIVITIES OF DAILY LIVING (ADL)
ORAL_HYGIENE: INDEPENDENT
DRESS: STREET CLOTHES;INDEPENDENT
GROOMING: HANDWASHING;SHOWER;INDEPENDENT

## 2017-07-26 NOTE — PLAN OF CARE
Problem: Depressive Symptoms  Goal: Depressive Symptoms  Signs and symptoms of listed problems will be absent or manageable.     Patient will be free of suicidal thoughts and s/s of depression.  Patient will take her medications at 100% compliance.  Patient will actively participate in group activities programmed by the unit.  Patient will identify at least 3 coping skills to relieve or reduce s/s of depression.  Patient will verbalize readiness for discharge.  Patient will sleep at least 7 hours at night and takes a nap at least 20 mins during the day.  Patient will socialize or interact with staff and peers.  Upon discharge, patient will verbalize utilization of coping skills she has identified.   Outcome: No Change  Patient informed staff and provider at start of shift that she would be filing a 12 hour intent to leave.  Provider reminded her that she had signed stay of commitment agreement to follow provider's recommendations, and told writer he would place her on a hold if she did file 12 hour notice, which she ultimately did not.  Patient was pleasant and cooperative the rest of the evening, and received PRN Vistaril.  No suicidal ideation voiced; mood appears stable.

## 2017-07-26 NOTE — PROGRESS NOTES
[facility] called [facility], [phone]. Left message for Natacha and Gilmeruno.     [facility] called [facility], [phone]. Left message with Alok.     Met with pt who was quite insistent that Natacha told her she could go to the [facility]. CTC advised has not gotten a phone call or e-mail confirming. Pt became agitated, stated that she has a ride waiting for her. [facility] reminded her of an earlier conversation in which [facility] advised her that we would send her via cab. Pt upset by this.

## 2017-07-26 NOTE — PROGRESS NOTES
[facility] made referral for case management to Parul Hunt at [facility].     [facility] also e-mailed the sober house to check on pt's status.

## 2017-07-26 NOTE — PROGRESS NOTES
"Pt dc'd to [facility] in [facility] via taxi; escorted to cab by staff and security. She verbalized understanding of dc instructions. Pt has dc meds and all belongings. She is anxious and a bit irritable re discharge, but is pleasant when given dc teaching. She reports her mood is \"good.\" She denies si or s/sx psychosis; none noted. She states she is hopeful and motivated for CD tx. Pt denies pain; vss. See dc felix also.  "

## 2017-07-27 NOTE — PROGRESS NOTES
[facility] received the following e-mail from ALEXIS Batista at [facility]:  Hello Elenita,  I would like to report to you and however you might be able to disseminate this to the people who should be informed.    Bhumi did not return to the house after stating she was getting here belongings. This being her first day I am not sure we will allow her to reside with us.    If you have any other questions feel free to contact us.    Thanks    [facility] called Francis  at [facility], [phone]. Left message regarding this situation. Call back from Francis. He has been in contact with Resource and will follow up with them regarding vacating.     VM from Chaya Miller,  for pt. [phone]. [facility] called back, left message. Chaya called back, will contact Francis concerning vacating the stay.    [facility] called pt's CPS worker, Mandy Cassidy, [phone]. Left message regarding the situation and pt's violation of the conditions of her release.  Call back from Mandy. Pt had called her yesterday and left a message. Mandy called back and a child answered. However, pt stated that she was at the sober house and doing paperwork. Hasn't heard from her since then. She will contact the [facility] .

## 2017-07-28 NOTE — PROGRESS NOTES
Call from Chaya at [facility], pt's CM. States that they cannot vacate pt unless she meets criterion for hospitalization. So, if pt's shows up in the ED and is admitted to the hospital she can vacate the stay of commitment. Otherwise, cannot.     Call from pt's CPS worker, Mandy. States that pt showed up at parents' home and wouldn't leave so has been arrested for violation of the OFP and is in long-term.    Call from Chaya. States that she spoke with Danie Cotto and was told that the stay can vacated. She will file the paperwork and advise the long-term.     [facility] called Mandy Cassidy. Office [phone]; [phone] (cell). Unable to leave message as both mailboxes were full.

## 2017-08-29 NOTE — DISCHARGE SUMMARY
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 The majority of her care was conducted by Kathy Oliver who petitioned the patient for commitment. The patient requested a change in provider and was transferred under my care. By that point, she was preparing to settle on a stay of commitment through the courts and had regained mood stability after being detoxed from substances and restarted on her antidepressant Effexor. She worked with our  on obtaining a chemical health assessment and obtaining the recommended referrals for outpatient MICD treatment while residing in sober housing. Noting the improvements he had gained, plans were made to transition her care outpatient providers under a stay of commitment for continued treatment.    Mood and anxiety-related symptoms had improved by the time of discharge and the patient denied suicidal and homicidal thoughts, plans, or intent.  Treatment team felt the patient was stable and ready for discharge.    No restraints or seclusions were required during their hospitalization.  No allergies or severe adverse reactions to the medications were noted.    Other interventions received during his hospitalization included:   Psychosocial treatments were addressed with groups, social work consult, and supportive milieu provided by staff.    CONDITION AT DISCHARGE:  Improved.   The patients acute suicide risk is low due to the following factors:  improved mood/anxiety symptoms.  Denies suicidal ideations. Denies psychotic symptoms.  Not actively intoxicated and plans to abstain from illicit substances and alcohol.  Denies access to guns.  Denies feeling hopeless or helpless. At the time of discharge Mallory Jaramillo was determined to not be an immediate danger to herself or others. The patient's acute risk will be higher if noncompliant with treatment plan, medications, follow-up or using illicit substances or alcohol.  These findings along with the risks of noncompliance with medications and treatment plan, which could potentially cause decompensation and increase the risk for suicide, were discussed with the patient.  The patients chronic suicide risk is moderate given the following factors: white race; diagnosis of MDD, unemployed; homeless; history of chemical dependency; Denied a family history of suicide.  Preventative factors include: social supports, children     MENTAL STATUS EXAMINATION AT TIME OF DISCHARGE:  The patient is 27 year old White female who appears their stated age and is appropriately dressed with good hygiene.  Calm and cooperative with the interview questions.  No psychomotor abnormalities are noted. Eye contact is appropriate. Speech has normal rate, tone, latency and volume and is not pushed or pressured. Mood is euthymic and affect is full and appropriate.  The patient does not seem overtly depressed, anxious, manic or irritable.  Thought process is linear, logical and future oriented.  Thought process is not tangential, circumstantial or disorganized.  Thought content is not significant for apperant paranoia, delusions, ideas of reference or grandiosity.  The patient denies suicidal and homicidal ideations as well as auditory and visual hallucinations.  Insight and judgment are fair.  Cognition appears intact to interviewing including orientation, recent and  remote memory, fund of knowledge, use of language, attention span and concentration.  Muscle strength, tone and gait appear normal on visual inspection.      DISPOSITION:  The patient is discharged to sober housing through [facility]    FOLLOWUP APPOINTMENTS:  ( per social workers notes and after visit summary)  1.  Outpatient chemical dependency treatment through the [facility] facility with initial intake appointment on July 26  2.  Referral for case management services through [employer].  3. Patient will schedule a follow-up appointment with her primary care physician for medication management    DISCHARGE MEDICATIONS:   Discharge Medication List as of 7/26/2017  1:08 PM      START taking these medications    Details   melatonin 3 MG tablet Take 2 tablets (6 mg) by mouth nightly as needed for sleep, Disp-60 tablet, R-0, E-Prescribe      venlafaxine (EFFEXOR-ER) 150 MG TB24 24 hr tablet Take 1 tablet (150 mg) by mouth daily (with breakfast), Disp-30 each, R-0, E-Prescribe         CONTINUE these medications which have CHANGED    Details   hydrOXYzine (ATARAX) 50 MG tablet Take 1 tablet (50 mg) by mouth 3 times daily as needed for anxiety, Disp-90 tablet, R-0, E-Prescribe      acamprosate (CAMPRAL) 333 MG EC tablet Take 2 tablets (666 mg) by mouth 3 times daily, Disp-180 tablet, R-0, E-Prescribe         STOP taking these medications       Venlafaxine HCl (EFFEXOR PO) Comments:   Reason for Stopping:         Lisdexamfetamine Dimesylate (VYVANSE PO) Comments:   Reason for Stopping:                LABORATORY RESULTS: (past 14 days)  No results found for this or any previous visit (from the past 336 hour(s)).    >30 minutes was spent on this discharge to allow for reviewing the patient's response to treatment, reviewing plan of care, education on medications and diagnosis, and conducting a risk assessment.

## 2018-05-24 ENCOUNTER — HOSPITAL ENCOUNTER (EMERGENCY)
Facility: CLINIC | Age: 28
Discharge: HOME | End: 2018-05-24
Attending: EMERGENCY MEDICINE | Admitting: EMERGENCY MEDICINE
Payer: COMMERCIAL

## 2018-05-24 VITALS
SYSTOLIC BLOOD PRESSURE: 119 MMHG | RESPIRATION RATE: 16 BRPM | HEART RATE: 76 BPM | TEMPERATURE: 97.6 F | DIASTOLIC BLOOD PRESSURE: 77 MMHG | OXYGEN SATURATION: 96 %

## 2018-05-24 LAB — ALCOHOL BREATH TEST: 0.1 (ref 0–0.01)

## 2018-05-24 PROCEDURE — 99283 EMERGENCY DEPT VISIT LOW MDM: CPT | Mod: Z6 | Performed by: EMERGENCY MEDICINE

## 2018-05-24 PROCEDURE — 99282 EMERGENCY DEPT VISIT SF MDM: CPT | Performed by: EMERGENCY MEDICINE

## 2018-05-24 ASSESSMENT — ENCOUNTER SYMPTOMS
FEVER: 0
DIFFICULTY URINATING: 0
SHORTNESS OF BREATH: 0
CONFUSION: 0
EYE REDNESS: 0
NECK STIFFNESS: 0
ARTHRALGIAS: 0
COLOR CHANGE: 0
ABDOMINAL PAIN: 0
HEADACHES: 0

## 2018-05-24 NOTE — PROGRESS NOTES
Emergency Social Work Services Note    Date of  Intervention: 05/24/18  Last Emergency Department Visit:  5/16/18  Care Plan:  No  Collaborated with:  sophia Barrgia's [facility] CD Commitment  [phone] through [facility]    Data:  SW received call from Nithya stating that pt will be arriving to the ED due to a relapse.  She states that pt is currently under a CD commitment with [facility].  Pt is in out-pt CD tx at [facility] and they offered to re-admit pt tomorrow when pt is not intoxicated.  She states that if discharged from the ED, pt would not have a place to go and they have counseled pt not to go to a homeless shelter as the risk of continued relapse is very high.  Nithya asks that detox placement be sought if pt is not admitted into the hospital.  Pt has a minor child that other family members are caring for but pt cannot stay with these family members at this time.    Pt's CD Counselor at [facility] is Hayde, [phone].    Intervention:  Discussion with CD Commitment  as above.      Since Nithya is recommending detox placement, SUZANNA has made the following inquiries:  1. [facility]:  No female beds open  2. [facility] [phone]:  No female beds for tonight  3.  [address] [place] [address] [phone]:  No female beds open due to their building being under construction.  4.  [facility]: [phone].  Spoke with Carley who states one female bed open.  She will need to review pt's facesheet, nursing and MD note and labs before committing to taking pt.  Pt would have to stay a minimum of 48 hours because they follow state policy.    SUZANNA discussed above with Dr. Ovalle.  Pt's previous chart notes reviewed.  It appears there is a Stay of Commitment order (petition was filed by [facility]) from July of 2017.  sophia Barriga's Commitment  had informed writer that pt is under a full commitment, but there is no paperwork on file that states  this.  Also, Nithya had considered revoking pt's commitment, however usually only a stay of commitment can be revoked so it seems that pt may still be on a stay of commitment.  SUZANNA tried to call Nithya x2 however it is now after business hours and Nithya does not answer.  SUZANNA attempted to call the [facility] to consult on this case, however it is noted that pt has left the ED as she does not require detox placement and informed medical staff that she can stay at a friends until entering CD tx tomorrow, Friday.    SUZANNA called and left Nithya a voicemail with above update.    Assessment:  CD relapse.  Pt under commitment with [facility].    Plan:    Anticipated Disposition:  to be determined.  Pt left to friend's home.    Barriers to d/c plan:  Medical clearance.    Follow Up:  Suzanna available as needed.      ANNA Epstein  Social Work Services  Emergency Department   [phone] phone  [phone] pager  On-call pager, [phone], 1600 to midnight

## 2018-05-24 NOTE — ED AVS SNAPSHOT
[facility], Emergency Department    [address]    [place] [address]    Phone:  [phone]    Fax:  [phone]                                       Mallory Jaramillo   MRN: [id number]    Department:  [facility], Emergency Department   Date of Visit:  5/24/2018           Patient Information     Date Of Birth          1990        Your diagnoses for this visit were:     Alcoholism /alcohol abuse (H)        You were seen by Pablo Ovalle MD.      Follow-up Information     Follow up with Clinic, [facility].    Contact information:    [address] [facility] [address] [phone]          Follow up with Treatment center In 1 day.      24 Hour Appointment Hotline       To make an appointment at any [facility], call [phone] ([phone]). If you don't have a family doctor or clinic, we will help you find one. [facility] clinics are conveniently located to serve the needs of you and your family.             Review of your medicines      Our records show that you are taking the medicines listed below. If these are incorrect, please call your family doctor or clinic.        Dose / Directions Last dose taken    acamprosate 333 MG EC tablet   Commonly known as:  CAMPRAL   Dose:  666 mg   Quantity:  180 tablet        Take 2 tablets (666 mg) by mouth 3 times daily   Refills:  0        hydrOXYzine 50 MG tablet   Commonly known as:  ATARAX   Dose:  50 mg   Quantity:  90 tablet        Take 1 tablet (50 mg) by mouth 3 times daily as needed for anxiety   Refills:  0        melatonin 3 MG tablet   Dose:  6 mg   Quantity:  60 tablet        Take 2 tablets (6 mg) by mouth nightly as needed for sleep   Refills:  0        venlafaxine 150 MG Tb24 24 hr tablet   Commonly known as:  EFFEXOR-ER   Dose:  150 mg   Quantity:  30 each        Take 1 tablet (150 mg) by mouth daily (with breakfast)   Refills:  0                Procedures and tests performed during your visit     Alcohol  "breath test POCT      Orders Needing Specimen Collection     None      Pending Results     No orders found from 2018 to 2018.            Pending Culture Results     No orders found from 2018 to 2018.            Pending Results Instructions     If you had any lab results that were not finalized at the time of your Discharge, you can call the ED Lab Result RN at [phone]. You will be contacted by this team for any positive Lab results or changes in treatment. The nurses are available 7 days a week from 10A to 6:30P.  You can leave a message 24 hours per day and they will return your call.        Thank you for choosing [facility]       Thank you for choosing [facility] for your care. Our goal is always to provide you with excellent care. Hearing back from our patients is one way we can continue to improve our services. Please take a few minutes to complete the written survey that you may receive in the mail after you visit with us. Thank you!        [employer] Information     [email or web address] lets you send messages to your doctor, view your test results, renew your prescriptions, schedule appointments and more. To sign up, go to [email or web address] . Click on \"Log in\" on the left side of the screen, which will take you to the Welcome page. Then click on \"Sign up Now\" on the right side of the page.     You will be asked to enter the access code listed below, as well as some personal information. Please follow the directions to create your username and password.     Your access code is: [email or web address]  Expires: 2018  5:28 PM     Your access code will  in 90 days. If you need help or a new code, please call your [facility] clinic or [phone].        Care EveryWhere ID     This is your Care EveryWhere ID. This could be used by other organizations to access your [facility] medical records  [id number]        Equal Access to Services     INES COULTER: ivy Mahoney, " elaine beauchamp ah. So [facility] [phone].    ATENCIÓN: Si habla daphneyañol, tiene a cortez disposición servicios gratuitos de asistencia lingüística. Llame al [phone].    We comply with applicable federal civil rights laws and [place] laws. We do not discriminate on the basis of race, color, national origin, age, disability, sex, sexual orientation, or gender identity.            After Visit Summary       This is your record. Keep this with you and show to your community pharmacist(s) and doctor(s) at your next visit.

## 2018-05-24 NOTE — ED PROVIDER NOTES
History     Chief Complaint   Patient presents with     Addiction Problem     0.104 on arrival in triage.  Pt was in treatment, relapsed ETOH 2 weeks ago.  Went to treatment facility today, plan was made to start with detox today, so pt came to ED.  Plan to return to treatment when detox complete.  No bed reserved on 3A.     HPI  Mallory Jaramillo is a 27 year old female who presents stating that she needs to be in detox.  She apparently presented to treatment today and was noted to have a breathalyzer of 0.16.  She was told that she would not be admitted into treatment until she had been sober for 24 hours.  She was sent here for possible detox.  She states that she drinks approximately 4 drinks a day for the past 2 weeks after relapsing.  Prior to that, she had been living in a sober house.  She notes that she has never had significant withdrawal symptoms other than slight shakes.  She specifically denies a history of DTs, seizures, significant tremulousness, nausea, vomiting or other.  She denies use of other drugs.  She denies acute medical ailments and likewise denies acute mental health symptoms but does state that she carries a diagnosis of anxiety, depression and PTSD.    I have reviewed the Medications, Allergies, Past Medical and Surgical History, and Social History in the Epic system.    Review of Systems   Constitutional: Negative for fever.   HENT: Negative for congestion.    Eyes: Negative for redness.   Respiratory: Negative for shortness of breath.    Cardiovascular: Negative for chest pain.   Gastrointestinal: Negative for abdominal pain.   Genitourinary: Negative for difficulty urinating.   Musculoskeletal: Negative for arthralgias and neck stiffness.   Skin: Negative for color change.   Neurological: Negative for headaches.   Psychiatric/Behavioral: Negative for confusion.       Physical Exam   BP: 119/77  Pulse: 76  Temp: 97.6  F (36.4  C)  Resp: 16  SpO2: 96 %      Physical Exam    Constitutional: No distress.   HENT:   Head: Atraumatic.   Mouth/Throat: Oropharynx is clear and moist. No oropharyngeal exudate.   Eyes: Pupils are equal, round, and reactive to light. No scleral icterus.   Cardiovascular: Normal heart sounds and intact distal pulses.    Pulmonary/Chest: Breath sounds normal. No respiratory distress.   Abdominal: Soft. Bowel sounds are normal. There is no tenderness.   Musculoskeletal: She exhibits no edema or tenderness.   Skin: Skin is warm. No rash noted. She is not diaphoretic.       ED Course     ED Course     Procedures             Labs Ordered and Resulted from Time of ED Arrival Up to the Time of Departure from the ED   ALCOHOL BREATH TEST POCT - Abnormal; Notable for the following:        Result Value    Alcohol Breath Test 0.104 (*)     All other components within normal limits            Assessments & Plan (with Medical Decision Making)   27-year-old female presents for evaluation of possible detox.  Patient has been drinking daily for the past 10-14 days.  She denies a history of significant withdrawal symptoms.  Initial exam revealed that her alcohol level was 0.104 with normal vital signs.  She was observed several hours and had no withdrawal symptoms.  There are no detox beds, however, patient would not meet medical need for detox.  She needs to stay sober until she enters treatment tomorrow.  She believes that she can accomplish this with resources available.  She will be discharged to follow-up with treatment tomorrow.    I have reviewed the nursing notes.    I have reviewed the findings, diagnosis, plan and need for follow up with the patient.    Discharge Medication List as of 5/24/2018  5:28 PM          Final diagnoses:   Alcoholism /alcohol abuse (H)       5/24/2018   [facility], [place], EMERGENCY DEPARTMENT     Pablo Ovalle MD  05/24/18 1921

## 2018-05-24 NOTE — ED AVS SNAPSHOT
[facility], Emergency Department    [address]    [place] [address]    Phone:  [phone]    Fax:  [phone]                                       Mallory Jaramillo   MRN: [id number]    Department:  [facility], Emergency Department   Date of Visit:  5/24/2018           After Visit Summary Signature Page     I have received my discharge instructions, and my questions have been answered. I have discussed any challenges I see with this plan with the nurse or doctor.    ..........................................................................................................................................  Patient/Patient Representative Signature      ..........................................................................................................................................  Patient Representative Print Name and Relationship to Patient    ..................................................               ................................................  Date                                            Time    ..........................................................................................................................................  Reviewed by Signature/Title    ...................................................              ..............................................  Date                                                            Time

## 2020-07-06 ENCOUNTER — APPOINTMENT (OUTPATIENT)
Dept: CT IMAGING | Facility: CLINIC | Age: 30
End: 2020-07-06
Attending: EMERGENCY MEDICINE

## 2020-07-06 ENCOUNTER — HOSPITAL ENCOUNTER (EMERGENCY)
Facility: CLINIC | Age: 30
Discharge: HOME | End: 2020-07-06
Attending: EMERGENCY MEDICINE | Admitting: EMERGENCY MEDICINE

## 2020-07-06 VITALS
RESPIRATION RATE: 20 BRPM | DIASTOLIC BLOOD PRESSURE: 81 MMHG | BODY MASS INDEX: 21.79 KG/M2 | WEIGHT: 135 LBS | TEMPERATURE: 98.4 F | HEART RATE: 89 BPM | SYSTOLIC BLOOD PRESSURE: 122 MMHG | OXYGEN SATURATION: 99 %

## 2020-07-06 DIAGNOSIS — S09.90XA INJURY OF HEAD, INITIAL ENCOUNTER: ICD-10-CM

## 2020-07-06 LAB
HCG UR QL: NEGATIVE
INTERNAL QC OK POCT: YES

## 2020-07-06 PROCEDURE — 99283 EMERGENCY DEPT VISIT LOW MDM: CPT | Mod: Z6 | Performed by: EMERGENCY MEDICINE

## 2020-07-06 PROCEDURE — 70450 CT HEAD/BRAIN W/O DYE: CPT

## 2020-07-06 PROCEDURE — 81025 URINE PREGNANCY TEST: CPT | Performed by: EMERGENCY MEDICINE

## 2020-07-06 PROCEDURE — 99284 EMERGENCY DEPT VISIT MOD MDM: CPT | Mod: 25 | Performed by: EMERGENCY MEDICINE

## 2020-07-06 RX ORDER — LORAZEPAM 1 MG/1
1 TABLET ORAL ONCE
COMMUNITY
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
## 2020-07-06 NOTE — ED NOTES
Pt states that she bought an Ativan tablet off the street to help with her anxiety, 1mg she believes, taken at 2200.

## 2020-07-06 NOTE — DISCHARGE INSTRUCTIONS
Please return the emergency department if you develop worsening pain, nausea, vomiting, weakness, numbness, tingling or if you have any further concerns.    If Mallory has discomfort from fever or other pain, she can have:  Acetaminophen (Tylenol) every 6 hours as needed. Her dose is:    2 regular strength tabs (650 mg)                                     (43.2+ kg/96+ lb)    NOTE: If your acetaminophen (Tylenol) came with a dropper marked with 0.4 and 0.8 ml, call us ([phone]) or check with your doctor about the dose before using it.     AND/OR      Ibuprofen (Advil, Motrin) every 6 hours as needed. His/her dose is:    2 regular strength tabs (400 mg)                                                                         (40-60 kg/ lb)
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
